# Patient Record
Sex: MALE | Race: WHITE | Employment: OTHER | URBAN - METROPOLITAN AREA
[De-identification: names, ages, dates, MRNs, and addresses within clinical notes are randomized per-mention and may not be internally consistent; named-entity substitution may affect disease eponyms.]

---

## 2017-03-28 ENCOUNTER — OFFICE VISIT (OUTPATIENT)
Dept: INTERNAL MEDICINE CLINIC | Age: 67
End: 2017-03-28

## 2017-03-28 ENCOUNTER — HOSPITAL ENCOUNTER (OUTPATIENT)
Dept: LAB | Age: 67
Discharge: HOME | End: 2017-03-28
Payer: MEDICARE

## 2017-03-28 VITALS
RESPIRATION RATE: 18 BRPM | HEART RATE: 58 BPM | DIASTOLIC BLOOD PRESSURE: 71 MMHG | WEIGHT: 212 LBS | TEMPERATURE: 98.3 F | HEIGHT: 66 IN | OXYGEN SATURATION: 97 % | SYSTOLIC BLOOD PRESSURE: 114 MMHG | BODY MASS INDEX: 34.07 KG/M2

## 2017-03-28 DIAGNOSIS — E11.42 TYPE 2 DIABETES MELLITUS WITH DIABETIC POLYNEUROPATHY, UNSPECIFIED LONG TERM INSULIN USE STATUS: Primary | ICD-10-CM

## 2017-03-28 DIAGNOSIS — E78.00 HYPERCHOLESTEREMIA: ICD-10-CM

## 2017-03-28 DIAGNOSIS — I10 ESSENTIAL HYPERTENSION: ICD-10-CM

## 2017-03-28 DIAGNOSIS — I25.10 CORONARY ARTERY DISEASE INVOLVING NATIVE CORONARY ARTERY OF NATIVE HEART WITHOUT ANGINA PECTORIS: ICD-10-CM

## 2017-03-28 PROCEDURE — 83036 HEMOGLOBIN GLYCOSYLATED A1C: CPT

## 2017-03-28 PROCEDURE — 80053 COMPREHEN METABOLIC PANEL: CPT

## 2017-03-28 PROCEDURE — 36415 COLL VENOUS BLD VENIPUNCTURE: CPT

## 2017-03-28 PROCEDURE — 80061 LIPID PANEL: CPT

## 2017-03-28 PROCEDURE — 85025 COMPLETE CBC W/AUTO DIFF WBC: CPT

## 2017-03-28 NOTE — MR AVS SNAPSHOT
Visit Information Date & Time Provider Department Dept. Phone Encounter #  
 3/28/2017  8:45 AM Sterling Roblero, [address] [id number] Follow-up Instructions Return in about 6 months (around 9/28/2017) for DM. Upcoming Health Maintenance Date Due COLONOSCOPY 5/19/1967 ZOSTER VACCINE AGE 60> 5/19/2009 MEDICARE YEARLY EXAM 5/29/2016 INFLUENZA AGE 9 TO ADULT 8/1/2016 EYE EXAM RETINAL OR DILATED Q1 10/19/2016 FOOT EXAM Q1 2/18/2017 HEMOGLOBIN A1C Q6M 2/19/2017 MICROALBUMIN Q1 8/19/2017 LIPID PANEL Q1 8/19/2017 GLAUCOMA SCREENING Q2Y 10/19/2017 Pneumococcal 65+ Low/Medium Risk (2 of 2 - PPSV23) 7/16/2018 DTaP/Tdap/Td series (2 - Td) 5/29/2025 Allergies as of 3/28/2017  Review Complete On: 3/28/2017 By: Sterling Roblero MD  
 No Known Allergies Current Immunizations  Reviewed on 3/28/2017 Name Date Influenza Vaccine 9/2/2015, 9/16/2014 Influenza Vaccine PF 10/30/2013 Pneumococcal Conjugate (PCV-13) 9/28/2015 Pneumococcal Polysaccharide (PPSV-23) 7/16/2013  6:31 PM  
 Tdap 5/29/2015 Reviewed by Sterling Roblero MD on 3/28/2017 at  9:23 AM  
 Reviewed by Sterling Roblero MD on 3/28/2017 at  9:25 AM  
You Were Diagnosed With   
  
 Codes Comments Type 2 diabetes mellitus with diabetic polyneuropathy, unspecified long term insulin use status ([facility]    -  Primary ICD-10-CM: E11.42 
ICD-9-CM: 250.60, 357.2 Essential hypertension     ICD-10-CM: I10 
ICD-9-CM: 401.9 Hypercholesteremia     ICD-10-CM: E78.00 ICD-9-CM: 272.0 Coronary artery disease involving native coronary artery of native heart without angina pectoris     ICD-10-CM: I25.10 ICD-9-CM: 414.01 Vitals BP Pulse Temp Resp Height(growth percentile) Weight(growth percentile) 114/71 (BP 1 Location: Left arm, BP Patient Position: Sitting) (!) 58 98.3 °F (36.8 °C) (Oral) 18 5' 6\" (1.676 m) 212 lb (96.2 kg) SpO2 BMI Smoking Status 97% 34.22 kg/m2 Never Smoker Vitals History BMI and BSA Data Body Mass Index Body Surface Area  
 34.22 kg/m 2 2.12 m 2 Preferred Pharmacy Pharmacy Name Phone [facility] #[address] [phone] Your Updated Medication List  
  
   
This list is accurate as of: 3/28/17  9:29 AM.  Always use your most recent med list.  
  
  
  
  
 aspirin 81 mg chewable tablet Take 1 Tab by mouth daily. clopidogrel 75 mg Tab Commonly known as:  PLAVIX Take 1 Tab by mouth daily. Diabetic Shoes Patient has diabetic foot neuropathy. He needs diabetic shoes to help with his diagnosis. Diabetic neuropathy: Code 250.60  
  
 gabapentin 600 mg tablet Commonly known as:  NEURONTIN Take 2 Tabs by mouth three (3) times daily. insulin detemir 100 unit/mL (3 mL) Inpn Commonly known as:  LEVEMIR FLEXTOUCH  
0.45 mL by SubCUTAneous route nightly. 45 Units by SubCUTAneous route nightly. * Insulin Needles (Disposable) 31 gauge x 3/16\" Ndle Commonly known as:  BD INSULIN PEN NEEDLE UF MINI Use as directed with Liset to inject insulin as directed * Insulin Needles (Disposable) 31 gauge x 1/4\" Ndle Commonly known as:  [place] Use as directed Lancets Misc Commonly known as:  Kimmy [place] Test TID  
  
 metFORMIN 500 mg tablet Commonly known as:  GLUCOPHAGE Take 1 Tab by mouth two (2) times daily (with meals). metoprolol tartrate 25 mg tablet Commonly known as:  LOPRESSOR Take 1 Tab by mouth two (2) times a day. pneumococcal 13 noman conj dip 0.5 mL Syrg injection Commonly known as:  PREVNAR 13 (PF)  
0.5 mL by IntraMUSCular route PRIOR TO DISCHARGE for 1 dose. pravastatin 40 mg tablet Commonly known as:  PRAVACHOL Take 40 mg by mouth nightly. Indications: HYPERCHOLESTEROLEMIA  
  
 simvastatin 10 mg tablet Commonly known as:  ZOCOR Take  by mouth nightly. traZODone 50 mg tablet Commonly known as:  Norm Oh Take 1 Tab by mouth nightly. valsartan-hydroCHLOROthiazide 160-25 mg per tablet Commonly known as:  DIOVAN-HCT  
TAKE ONE TABLET BY MOUTH ONE TIME DAILY * Notice: This list has 2 medication(s) that are the same as other medications prescribed for you. Read the directions carefully, and ask your doctor or other care provider to review them with you. We Performed the Following CBC WITH AUTOMATED DIFF [09292 CPT(R)] HEMOGLOBIN A1C WITH EAG [15231 CPT(R)] LIPID PANEL [83266 CPT(R)] METABOLIC PANEL, COMPREHENSIVE [39015 CPT(R)] Follow-up Instructions Return in about 6 months (around 9/28/2017) for DM. Introducing [facility] SERVICES! Dear Basilio Chung: 
Thank you for requesting a ePatientFinder account. Our records indicate that you already have an active ePatientFinder account. You can access your account anytime at [email or web address] [employer]/"[email or web address] Did you know that you can access your hospital and ER discharge instructions at any time in ePatientFinder? You can also review all of your test results from your hospital stay or ER visit. Additional Information If you have questions, please visit the Frequently Asked Questions section of the ePatientFinder website at [email or web address] [employer]/"[email or web address]/. Remember, ePatientFinder is NOT to be used for urgent needs. For medical emergencies, dial 911. Now available from your iPhone and Android! Please provide this summary of care documentation to your next provider. Your primary care clinician is listed as [place]. If you have any questions after today's visit, please call [phone].

## 2017-03-28 NOTE — PROGRESS NOTES
SUBJECTIVE:   Mr. Bushra Mckenzie. is a 78 y.o. male who is here for follow up of routine medical issues. Previously saw Dr. Oswald Gamboa. Chief Complaint   Patient presents with    Diabetes    Hypertension    Follow-up     pt here today for routine f.u       Sleeping better. Gets fatigue in the middle of the afternoon. Glc at home runs 130s - 140s. He sees Dr. Ervin Anders for CAD. Denies CP. He has severe neuralgias in feet, controlled by gabapentin. Dr. Ami Fair did EMG/NCV in Jan 2014. Last colonoscopy more than 10 years ago. At this time, he is otherwise doing well and has brought no other complaints to my attention today. For a list of the medical issues addressed today, see the assessment and plan below. PMH:   Past Medical History:   Diagnosis Date    CAD (coronary artery disease)     Diabetes ([address]     Heart disease     Hypertension        Past Surgical History:   Procedure Laterality Date    CARDIAC SURG PROCEDURE UNLIST      HX HEMORRHOIDECTOMY         All: He has No Known Allergies. Current Outpatient Prescriptions   Medication Sig    Insulin Needles, Disposable, (UNIFINE PENTIPS) 31 gauge x 1/4\" ndle Use as directed    valsartan-hydrochlorothiazide (DIOVAN-HCT) 160-25 mg per tablet TAKE ONE TABLET BY MOUTH ONE TIME DAILY    metFORMIN (GLUCOPHAGE) 500 mg tablet Take 1 Tab by mouth two (2) times daily (with meals).  gabapentin (NEURONTIN) 600 mg tablet Take 2 Tabs by mouth three (3) times daily.  traZODone (DESYREL) 50 mg tablet Take 1 Tab by mouth nightly.  insulin detemir (LEVEMIR FLEXTOUCH) 100 unit/mL (3 mL) inpn 0.45 mL by SubCUTAneous route nightly. 45 Units by SubCUTAneous route nightly.  simvastatin (ZOCOR) 10 mg tablet Take  by mouth nightly.  Insulin Needles, Disposable, (BD INSULIN PEN NEEDLE UF MINI) 31 gauge x 3/16\" ndle Use as directed with Liset to inject insulin as directed    Diabetic Shoes XX Patient has diabetic foot neuropathy.  He needs diabetic shoes to help with his diagnosis. Diabetic neuropathy: Code 250.60    Lancets (MICROLET LANCET) misc Test TID    aspirin 81 mg chewable tablet Take 1 Tab by mouth daily.  clopidogrel (PLAVIX) 75 mg tablet Take 1 Tab by mouth daily.  metoprolol (LOPRESSOR) 25 mg tablet Take 1 Tab by mouth two (2) times a day.  pneumococcal 13 noman conj dip (PREVNAR 13, PF,) 0.5 mL syrg injection 0.5 mL by IntraMUSCular route PRIOR TO DISCHARGE for 1 dose.  pravastatin (PRAVACHOL) 40 mg tablet Take 40 mg by mouth nightly. Indications: HYPERCHOLESTEROLEMIA     No current facility-administered medications for this visit. FH: His family history includes Heart Disease in his paternal uncle; and Hypertension in his paternal uncle. Mother  of cancer of some type. Father is living. His brother had prostate cancer. SH: . Retired from [employer], and from [employer], and from [employer]. He reports that he has never smoked. He has never used smokeless tobacco. He reports that he does not drink alcohol or use illicit drugs. ROS: See above; Complete ROS otherwise negative. OBJECTIVE:   Vitals:   Visit Vitals    /71 (BP 1 Location: Left arm, BP Patient Position: Sitting)    Pulse (!) 58    Temp 98.3 °F (36.8 °C) (Oral)    Resp 18    Ht 5' 6\" (1.676 m)    Wt 212 lb (96.2 kg)    SpO2 97%    BMI 34.22 kg/m2      Gen: Pleasant 78 y.o.  male in [facility]. HEENT: PERRLA. EOMI. OP moist and pink. Neck: Supple. No LAD. HEART: RRR, No M/G/R.    LUNGS: CTAB No W/R. ABDOMEN: S, NT, ND, BS+. EXTREMITIES: Warm. No C/C/E.  MUSCULOSKELETAL: Normal ROM, muscle strength 5/5 all groups. NEURO: Alert and oriented x 3. Cranial nerves grossly intact. No focal sensory or motor deficits noted. SKIN: Warm. Dry. No rashes or other lesions noted. Feet: Has onychomycosis of both great nails. Sensation diminished to monofilament in toes.      Lab Results   Component Value Date/Time    Sodium 139 08/19/2016 08:20 AM    Potassium 4.8 08/19/2016 08:20 AM    Chloride 100 08/19/2016 08:20 AM    CO2 24 08/19/2016 08:20 AM    Anion gap 12 07/17/2013 03:25 AM    Glucose 163 08/19/2016 08:20 AM    BUN 24 08/19/2016 08:20 AM    Creatinine 1.03 08/19/2016 08:20 AM    BUN/Creatinine ratio 23 08/19/2016 08:20 AM    GFR est AA 86 08/19/2016 08:20 AM    GFR est non-AA 75 08/19/2016 08:20 AM    Calcium 8.9 08/19/2016 08:20 AM    Bilirubin, total 0.4 08/19/2016 08:20 AM    ALT (SGPT) 16 08/19/2016 08:20 AM    AST (SGOT) 14 08/19/2016 08:20 AM    Alk. phosphatase 64 08/19/2016 08:20 AM    Protein, total 6.2 08/19/2016 08:20 AM    Albumin 4.2 08/19/2016 08:20 AM    Globulin 3.1 07/14/2013 09:50 AM    A-G Ratio 2.1 08/19/2016 08:20 AM       Lab Results   Component Value Date/Time    Cholesterol, total 144 08/19/2016 08:20 AM    HDL Cholesterol 36 08/19/2016 08:20 AM    LDL, calculated 87 08/19/2016 08:20 AM    Triglyceride 106 08/19/2016 08:20 AM    CHOL/HDL Ratio 5.1 07/15/2013 09:10 AM        Lab Results   Component Value Date/Time    Hemoglobin A1c 9.3 08/19/2016 08:20 AM       Lab Results   Component Value Date/Time    WBC 6.7 08/19/2016 08:20 AM    HGB 15.4 08/19/2016 08:20 AM    HCT 46.5 08/19/2016 08:20 AM    PLATELET 773 61/41/1699 08:20 AM    MCV 94 08/19/2016 08:20 AM         ASSESSMENT/ PLAN: Susu Tracy was seen today for follow up. DM (diabetes mellitus): Not controlled at last check. What he is getting at home isn't bad. Continue insulin. Seeing Dr. Najera [place] with Endocrinology.   - HEMOGLOBIN N7Z  - METABOLIC PANEL, COMPREHENSIVE  - LIPID PANEL    Midday fatigue: Check glucose. Insomnia: Improved. trazodone. Anxiety: Bedtime trazodone. Consider other agents down the road. HTN (hypertension): Borderline BP; watch this. - METABOLIC PANEL, COMPREHENSIVE  - LIPID PANEL  - CBC WITH AUTOMATED DIFF    Hypercholesteremia  - LIPID PANEL    Coronary artery disease: No CP today. F/U with Cardiology. Diabetic neuropathy: Ongoing, stable. Gapabentin helps. - gabapentin (NEURONTIN) 600 mg tablet; Take 2 tablets by mouth three (3) times daily. Colon cancer screening: Previously have referred to GI. He is disinclined to follow through--\"I heard too many bad stories\"; I reassured him that the procedure saves far more lives than it harms. Follow-up Disposition:  Return in about 6 months (around 9/28/2017) for DM. I have reviewed the patient's medications and risks/side effects/benefits were discussed. Diagnosis(-es) explained to patient and questions answered. Literature provided where appropriate.

## 2017-03-28 NOTE — PROGRESS NOTES
1. Have you been to the ER, urgent care clinic since your last visit? Hospitalized since your last visit?no    2. Have you seen or consulted any other health care providers outside of the [address] [place] [address] since your last visit? Include any pap smears or colon screening.  no

## 2017-03-29 LAB
ALBUMIN SERPL-MCNC: 4.5 G/DL (ref 3.6–4.8)
ALBUMIN/GLOB SERPL: 2.3 {RATIO} (ref 1.2–2.2)
ALP SERPL-CCNC: 67 IU/L (ref 39–117)
ALT SERPL-CCNC: 16 IU/L (ref 0–44)
AST SERPL-CCNC: 15 IU/L (ref 0–40)
BASOPHILS # BLD AUTO: 0 X10E3/UL (ref 0–0.2)
BASOPHILS NFR BLD AUTO: 0 %
BILIRUB SERPL-MCNC: 0.5 MG/DL (ref 0–1.2)
BUN SERPL-MCNC: 17 MG/DL (ref 8–27)
BUN/CREAT SERPL: 18 (ref 10–22)
CALCIUM SERPL-MCNC: 9.3 MG/DL (ref 8.6–10.2)
CHLORIDE SERPL-SCNC: 99 MMOL/L (ref 96–106)
CHOLEST SERPL-MCNC: 170 MG/DL (ref 100–199)
CO2 SERPL-SCNC: 21 MMOL/L (ref 18–29)
CREAT SERPL-MCNC: 0.96 MG/DL (ref 0.76–1.27)
EOSINOPHIL # BLD AUTO: 0.1 X10E3/UL (ref 0–0.4)
EOSINOPHIL NFR BLD AUTO: 1 %
ERYTHROCYTE [DISTWIDTH] IN BLOOD BY AUTOMATED COUNT: 13.4 % (ref 12.3–15.4)
EST. AVERAGE GLUCOSE BLD GHB EST-MCNC: 223 MG/DL
GLOBULIN SER CALC-MCNC: 2 G/DL (ref 1.5–4.5)
GLUCOSE SERPL-MCNC: 247 MG/DL (ref 65–99)
HBA1C MFR BLD: 9.4 % (ref 4.8–5.6)
HCT VFR BLD AUTO: 46.8 % (ref 37.5–51)
HDLC SERPL-MCNC: 40 MG/DL
HGB BLD-MCNC: 15.8 G/DL (ref 12.6–17.7)
IMM GRANULOCYTES # BLD: 0 X10E3/UL (ref 0–0.1)
IMM GRANULOCYTES NFR BLD: 0 %
LDLC SERPL CALC-MCNC: 104 MG/DL (ref 0–99)
LYMPHOCYTES # BLD AUTO: 2.2 X10E3/UL (ref 0.7–3.1)
LYMPHOCYTES NFR BLD AUTO: 32 %
MCH RBC QN AUTO: 31.2 PG (ref 26.6–33)
MCHC RBC AUTO-ENTMCNC: 33.8 G/DL (ref 31.5–35.7)
MCV RBC AUTO: 93 FL (ref 79–97)
MONOCYTES # BLD AUTO: 0.7 X10E3/UL (ref 0.1–0.9)
MONOCYTES NFR BLD AUTO: 10 %
NEUTROPHILS # BLD AUTO: 4 X10E3/UL (ref 1.4–7)
NEUTROPHILS NFR BLD AUTO: 57 %
PLATELET # BLD AUTO: 221 X10E3/UL (ref 150–379)
POTASSIUM SERPL-SCNC: 4.9 MMOL/L (ref 3.5–5.2)
PROT SERPL-MCNC: 6.5 G/DL (ref 6–8.5)
RBC # BLD AUTO: 5.06 X10E6/UL (ref 4.14–5.8)
SODIUM SERPL-SCNC: 141 MMOL/L (ref 134–144)
TRIGL SERPL-MCNC: 132 MG/DL (ref 0–149)
VLDLC SERPL CALC-MCNC: 26 MG/DL (ref 5–40)
WBC # BLD AUTO: 7 X10E3/UL (ref 3.4–10.8)

## 2017-04-05 ENCOUNTER — TELEPHONE (OUTPATIENT)
Dept: INTERNAL MEDICINE CLINIC | Age: 67
End: 2017-04-05

## 2017-07-05 RX ORDER — METFORMIN HYDROCHLORIDE 500 MG/1
TABLET ORAL
Qty: 180 TAB | Refills: 2 | Status: SHIPPED | OUTPATIENT
Start: 2017-07-05 | End: 2018-05-21 | Stop reason: SDUPTHER

## 2017-07-26 NOTE — TELEPHONE ENCOUNTER
Patient's wife, Makenna Wei states patient needs prescription to show uses 2 needles a day & prescription to be for 2 a day at 90 day supply with refills thru [facility]. Please call if any questions.  Thank you

## 2017-07-27 RX ORDER — PEN NEEDLE, DIABETIC 31 GX3/16"
NEEDLE, DISPOSABLE MISCELLANEOUS
Qty: 100 PEN NEEDLE | Refills: 6 | Status: SHIPPED | OUTPATIENT
Start: 2017-07-27 | End: 2018-01-03 | Stop reason: SDUPTHER

## 2017-07-27 NOTE — TELEPHONE ENCOUNTER
Patient's wife, Kevin Lieberman states patient needs prescription to show uses 2 needles a day & prescription to be for 2 a day at 90 day supply with refills thru [facility]. Please call if any questions.  Thank you                  Documentation                                 Forwarded to dr. Sunita Kiran for review

## 2017-08-01 ENCOUNTER — OFFICE VISIT (OUTPATIENT)
Dept: INTERNAL MEDICINE CLINIC | Age: 67
End: 2017-08-01

## 2017-08-01 VITALS
TEMPERATURE: 98.6 F | HEART RATE: 57 BPM | HEIGHT: 66 IN | DIASTOLIC BLOOD PRESSURE: 71 MMHG | SYSTOLIC BLOOD PRESSURE: 113 MMHG | BODY MASS INDEX: 33.85 KG/M2 | OXYGEN SATURATION: 96 % | RESPIRATION RATE: 14 BRPM | WEIGHT: 210.6 LBS

## 2017-08-01 DIAGNOSIS — I10 ESSENTIAL HYPERTENSION: ICD-10-CM

## 2017-08-01 DIAGNOSIS — Z12.11 COLON CANCER SCREENING: ICD-10-CM

## 2017-08-01 DIAGNOSIS — E78.00 HYPERCHOLESTEREMIA: ICD-10-CM

## 2017-08-01 DIAGNOSIS — Z13.39 SCREENING FOR ALCOHOLISM: ICD-10-CM

## 2017-08-01 DIAGNOSIS — E11.42 TYPE 2 DIABETES MELLITUS WITH DIABETIC POLYNEUROPATHY, UNSPECIFIED LONG TERM INSULIN USE STATUS: ICD-10-CM

## 2017-08-01 DIAGNOSIS — Z00.00 ROUTINE GENERAL MEDICAL EXAMINATION AT A HEALTH CARE FACILITY: Primary | ICD-10-CM

## 2017-08-01 DIAGNOSIS — I25.10 CORONARY ARTERY DISEASE INVOLVING NATIVE CORONARY ARTERY OF NATIVE HEART WITHOUT ANGINA PECTORIS: ICD-10-CM

## 2017-08-01 NOTE — PROGRESS NOTES
Medicare  This is a Subsequent Medicare Annual Wellness Visit providing Personalized Prevention Plan Services (PPPS) (Performed 12 months after initial AWV and PPPS )    I have reviewed the patient's medical history in detail and updated the computerized patient record. History     Past Medical History:   Diagnosis Date    CAD (coronary artery disease)     Diabetes ([address]     Heart disease     Hypertension       Past Surgical History:   Procedure Laterality Date    CARDIAC SURG PROCEDURE UNLIST      HX HEMORRHOIDECTOMY       Current Outpatient Prescriptions   Medication Sig Dispense Refill    Insulin Needles, Disposable, (BD INSULIN PEN NEEDLE UF MINI) 31 gauge x 3/16\" ndle Use as directed with Levimer to inject insulin as directed 100 Pen Needle 6    metFORMIN (GLUCOPHAGE) 500 mg tablet TAKE 1 TABLET TWICE A DAY WITH MEALS 180 Tab 2    Insulin Needles, Disposable, (UNIFINE PENTIPS) 31 gauge x 1/4\" ndle Use as directed 100 Pen Needle 3    valsartan-hydrochlorothiazide (DIOVAN-HCT) 160-25 mg per tablet TAKE ONE TABLET BY MOUTH ONE TIME DAILY 90 Tab 3    gabapentin (NEURONTIN) 600 mg tablet Take 2 Tabs by mouth three (3) times daily. 540 Tab 3    traZODone (DESYREL) 50 mg tablet Take 1 Tab by mouth nightly. 90 Tab 3    insulin detemir (LEVEMIR FLEXTOUCH) 100 unit/mL (3 mL) inpn 0.45 mL by SubCUTAneous route nightly. 45 Units by SubCUTAneous route nightly. 1500 mL 3    pneumococcal 13 noman conj dip (PREVNAR 13, PF,) 0.5 mL syrg injection 0.5 mL by IntraMUSCular route PRIOR TO DISCHARGE for 1 dose. 1 Syringe 0    simvastatin (ZOCOR) 10 mg tablet Take  by mouth nightly.  Diabetic Shoes XX Patient has diabetic foot neuropathy. He needs diabetic shoes to help with his diagnosis. Diabetic neuropathy: Code 250.60 1 Device 0    Lancets (MICROLET LANCET) misc Test  Each 11    aspirin 81 mg chewable tablet Take 1 Tab by mouth daily.  30 Tab 11    clopidogrel (PLAVIX) 75 mg tablet Take 1 Tab by mouth daily. 30 Tab 11    metoprolol (LOPRESSOR) 25 mg tablet Take 1 Tab by mouth two (2) times a day. 60 Tab 11    pravastatin (PRAVACHOL) 40 mg tablet Take 40 mg by mouth nightly. Indications: HYPERCHOLESTEROLEMIA       No Known Allergies  Family History   Problem Relation Age of Onset    Cancer Mother      ovarian    Cancer Brother      Prostate    Hypertension Paternal Uncle     Heart Disease Paternal Uncle     Alcohol abuse Father     No Known Problems Brother     No Known Problems Brother     No Known Problems Brother      Social History   Substance Use Topics    Smoking status: Never Smoker    Smokeless tobacco: Never Used      Comment: occ    Alcohol use No      Comment: ETOH free for 2 years     Patient Active Problem List   Diagnosis Code    DM (diabetes mellitus) ([facility] E11.9    HTN (hypertension) I10    Hypercholesteremia E78.00    Diabetic neuropathy [facility] E11.40    Coronary artery disease I25.10    H/O heart artery stent Z95.5    Essential hypertension I10       Depression Risk Factor Screening:     PHQ over the last two weeks 3/28/2017   Little interest or pleasure in doing things Not at all   Feeling down, depressed or hopeless Not at all   Total Score PHQ 2 0     Alcohol Risk Factor Screening: On any occasion during the past 3 months, have you had more than 4 drinks containing alcohol? No    Do you average more than 14 drinks per week? No        Functional Ability and Level of Safety:     Hearing Loss   none    Activities of Daily Living   Self-care. Requires assistance with: no ADLs    Fall Risk   Fall Risk Assessment, last 12 mths 3/28/2017   Able to walk? Yes   Fall in past 12 months? No     Abuse Screen   Patient is not abused    Review of Systems   A comprehensive review of systems was negative except for that written in the HPI.     Physical Examination     Evaluation of Cognitive Function:  Mood/affect:  okay  Appearance: age appropriate    See other note    Patient Care Team:  Clare Gonzalez MD as PCP - General (Internal Medicine)  Jocelyn Merritt MD (Cardiology)  Sourav Gomez MD (Neurology)    Advice/Referrals/Counseling   Education and counseling provided:  Are appropriate based on today's review and evaluation      Assessment/Plan   current treatment plan is effective, no change in therapy.

## 2017-08-01 NOTE — MR AVS SNAPSHOT
Visit Information Date & Time Provider Department Dept. Phone Encounter #  
 8/1/2017 10:30 AM Negar Alcantara, [address] [phone] [id number] Follow-up Instructions Return in about 4 months (around 12/1/2017) for DM. Follow-up and Disposition History Upcoming Health Maintenance Date Due COLONOSCOPY 5/19/1967 ZOSTER VACCINE AGE 60> 3/19/2009 MEDICARE YEARLY EXAM 5/29/2016 EYE EXAM RETINAL OR DILATED Q1 10/19/2016 INFLUENZA AGE 9 TO ADULT 8/1/2017 MICROALBUMIN Q1 8/19/2017 HEMOGLOBIN A1C Q6M 9/28/2017 GLAUCOMA SCREENING Q2Y 10/19/2017 FOOT EXAM Q1 3/28/2018 LIPID PANEL Q1 3/28/2018 Pneumococcal 65+ Low/Medium Risk (2 of 2 - PPSV23) 7/16/2018 DTaP/Tdap/Td series (2 - Td) 5/29/2025 Allergies as of 8/1/2017  Review Complete On: 8/1/2017 By: Negar Alcantara MD  
 No Known Allergies Current Immunizations  Reviewed on 3/28/2017 Name Date Influenza Vaccine 9/2/2015, 9/16/2014 Influenza Vaccine PF 10/30/2013 Pneumococcal Conjugate (PCV-13) 9/28/2015 Pneumococcal Polysaccharide (PPSV-23) 7/16/2013  6:31 PM  
 Tdap 5/29/2015 Not reviewed this visit You Were Diagnosed With   
  
 Codes Comments Routine general medical examination at a health care facility    -  Primary ICD-10-CM: Z00.00 ICD-9-CM: V70.0 Type 2 diabetes mellitus with diabetic polyneuropathy, unspecified long term insulin use status (HCC)     ICD-10-CM: E11.42 
ICD-9-CM: 250.60, 357.2 Essential hypertension     ICD-10-CM: I10 
ICD-9-CM: 401.9 Hypercholesteremia     ICD-10-CM: E78.00 ICD-9-CM: 272.0 Coronary artery disease involving native coronary artery of native heart without angina pectoris     ICD-10-CM: I25.10 ICD-9-CM: 414.01 Colon cancer screening     ICD-10-CM: Z12.11 ICD-9-CM: V76.51 Screening for alcoholism     ICD-10-CM: Z13.89 ICD-9-CM: V79.1 Vitals BP Pulse Temp Resp Height(growth percentile) Weight(growth percentile) 113/71 (BP 1 Location: Left arm, BP Patient Position: Sitting) (!) 57 98.6 °F (37 °C) (Oral) 14 5' 6\" (1.676 m) 210 lb 9.6 oz (95.5 kg) SpO2 BMI Smoking Status 96% 33.99 kg/m2 Never Smoker Vitals History BMI and BSA Data Body Mass Index Body Surface Area  
 33.99 kg/m 2 2.11 m 2 Preferred Pharmacy Pharmacy Name Phone [address] [facility] [phone] Your Updated Medication List  
  
   
This list is accurate as of: 8/1/17 11:29 AM.  Always use your most recent med list.  
  
  
  
  
 aspirin 81 mg chewable tablet Take 1 Tab by mouth daily. clopidogrel 75 mg Tab Commonly known as:  PLAVIX Take 1 Tab by mouth daily. Diabetic Shoes Patient has diabetic foot neuropathy. He needs diabetic shoes to help with his diagnosis. Diabetic neuropathy: Code 250.60  
  
 gabapentin 600 mg tablet Commonly known as:  NEURONTIN Take 2 Tabs by mouth three (3) times daily. insulin detemir 100 unit/mL (3 mL) Inpn Commonly known as:  LEVEMIR FLEXTOUCH  
0.45 mL by SubCUTAneous route nightly. 45 Units by SubCUTAneous route nightly. * Insulin Needles (Disposable) 31 gauge x 1/4\" Ndle Commonly known as:  Madeline Low Use as directed * Insulin Needles (Disposable) 31 gauge x 3/16\" Ndle Commonly known as:  BD INSULIN PEN NEEDLE UF MINI Use as directed with Liset to inject insulin as directed Lancets Misc Commonly known as:  Marlene Evangelical Test TID  
  
 metFORMIN 500 mg tablet Commonly known as:  GLUCOPHAGE  
TAKE 1 TABLET TWICE A DAY WITH MEALS  
  
 metoprolol tartrate 25 mg tablet Commonly known as:  LOPRESSOR Take 1 Tab by mouth two (2) times a day. pneumococcal 13 noman conj dip 0.5 mL Syrg injection Commonly known as:  PREVNAR 13 (PF)  
 0.5 mL by IntraMUSCular route PRIOR TO DISCHARGE for 1 dose. pravastatin 40 mg tablet Commonly known as:  PRAVACHOL Take 40 mg by mouth nightly. Indications: HYPERCHOLESTEROLEMIA  
  
 simvastatin 10 mg tablet Commonly known as:  ZOCOR Take  by mouth nightly. traZODone 50 mg tablet Commonly known as:  Chica Demi Take 1 Tab by mouth nightly. valsartan-hydroCHLOROthiazide 160-25 mg per tablet Commonly known as:  DIOVAN-HCT  
TAKE ONE TABLET BY MOUTH ONE TIME DAILY * Notice: This list has 2 medication(s) that are the same as other medications prescribed for you. Read the directions carefully, and ask your doctor or other care provider to review them with you. We Performed the Following CBC WITH AUTOMATED DIFF [46540 CPT(R)] HEMOGLOBIN A1C WITH EAG [57290 CPT(R)] LIPID PANEL [26036 CPT(R)] METABOLIC PANEL, COMPREHENSIVE [89082 CPT(R)] MICROALBUMIN, UR, RAND [id number] CPT(R)] OCCULT BLOOD, IMMUNOASSAY (FIT) C502979 CPT(R)] REFERRAL TO GASTROENTEROLOGY [CAP39 Custom] REFERRAL TO OPHTHALMOLOGY [REF57 Custom] Follow-up Instructions Return in about 4 months (around 12/1/2017) for DM. Referral Information Referral ID Referred By Referred To  
  
 [id number] HOLLY, [address] [facility] [address] [place] [address], [address] [facility] [address] Phone: [phone] Fax: [phone] Visits Status Start Date End Date 1 New Request 8/1/17 8/1/18 If your referral has a status of pending review or denied, additional information will be sent to support the outcome of this decision. Referral ID Referred By Referred To  
 [id number] HOLLY [address] [place] [address]  
   305 [facility] [address] Visits Status Start Date End Date 1 New Request 8/1/17 8/1/18  If your referral has a status of pending review or denied, additional information will be sent to support the outcome of this decision. Patient Instructions Medicare Part B Preventive Services Limitations Recommendation Scheduled Bone Mass Measurement 
(age 72 & older, biennial) Requires diagnosis related to osteoporosis or estrogen deficiency. Biennial benefit unless patient has history of long-term glucocorticoid tx or baseline is needed because initial test was by other method Cardiovascular Screening Blood Tests (every 5 years) Total cholesterol, HDL, Triglycerides Order as a panel if possible Colorectal Cancer Screening 
-Fecal occult blood test (annual) -Flexible sigmoidoscopy (5y) 
-Screening colonoscopy (10y) -Barium Enema Counseling to Prevent Tobacco Use (up to 8 sessions per year) - Counseling greater than 3 and up to 10 minutes - Counseling greater than 10 minutes Patients must be asymptomatic of tobacco-related conditions to receive as preventive service Diabetes Screening Tests (at least every 3 years, Medicare covers annually or at 6-month intervals for prediabetic patients) Fasting blood sugar (FBS) or glucose tolerance test (GTT) Patient must be diagnosed with one of the following: 
-Hypertension, Dyslipidemia, obesity, previous impaired FBS or GTT 
Or any two of the following: overweight, FH of diabetes, age ? 72, history of gestational diabetes, birth of baby weighing more than 9 pounds Diabetes Self-Management Training (DSMT) (no USPSTF recommendation) Requires referral by treating physician for patient with diabetes or renal disease. 10 hours of initial DSMT session of no less than 30 minutes each in a continuous 12-month period. 2 hours of follow-up DSMT in subsequent years. Glaucoma Screening (no USPSTF recommendation) Diabetes mellitus, family history, , age 48 or over,  American, age 72 or over Human Immunodeficiency Virus (HIV) Screening (annually for increased risk patients) HIV-1 and HIV-2 by EIA, MARCOS, rapid antibody test, or oral mucosa transudate Patient must be at increased risk for HIV infection per USPSTF guidelines or pregnant. Tests covered annually for patients at increased risk. Pregnant patients may receive up to 3 test during pregnancy. Medical Nutrition Therapy (MNT) (for diabetes or renal disease not recommended schedule) Requires referral by treating physician for patient with diabetes or renal disease. Can be provided in same year as diabetes self-management training (DSMT), and CMS recommends medical nutrition therapy take place after DSMT. Up to 3 hours for initial year and 2 hours in subsequent years. Prostate Cancer Screening (annually up to age 76) - Digital rectal exam (NGA) - Prostate specific antigen (PSA) Annually (age 48 or over), NGA not paid separately when covered E/M service is provided on same date Men up to age 76 may need a screening blood test for prostate cancer at certain intervals, depending on their personal and family history. This decision is between the patient and his provider. Seasonal Influenza Vaccination (annually) Pneumococcal Vaccination (once after 65) Hepatitis B Vaccinations (if medium/high risk) Medium/high risk factors:  End-stage renal disease, Hemophiliacs who received Factor VIII or IX concentrates, Clients of institutions for the mentally retarded, Persons who live in the same house as a HepB virus carrier, Homosexual men, Illicit injectable drug abusers. Shingles Vaccination A shingles vaccine is also recommended once in a lifetime after age 61 Ultrasound Screening for Abdominal Aortic Aneurysm (AAA) (once) Patient must be referred through [facility] and not have had a screening for abdominal aortic aneurysm before under Medicare. Limited to patients who meet one of the following criteria: 
- Men who are 73-68 years old and have smoked more than 100 cigarettes in their lifetime. -Anyone with a FH of AAA 
-Anyone recommended for screening by USPSTF Introducing [facility]! Dear Lesile Alvarado: 
Thank you for requesting a Screenleap account. Our records indicate that you already have an active Screenleap account. You can access your account anytime at [email or web address] Did you know that you can access your hospital and ER discharge instructions at any time in Screenleap? You can also review all of your test results from your hospital stay or ER visit. Additional Information If you have questions, please visit the Frequently Asked Questions section of the Screenleap website at [email or web address] Remember, Screenleap is NOT to be used for urgent needs. For medical emergencies, dial 911. Now available from your iPhone and Android! Please provide this summary of care documentation to your next provider. Your primary care clinician is listed as [place]. If you have any questions after today's visit, please call [phone].

## 2017-08-01 NOTE — PROGRESS NOTES
SUBJECTIVE:   Mr. Cristiano Osei. is a 75 y.o. male who is here for follow up of routine medical issues. Previously saw Dr. Pershing Scheuermann. Chief Complaint   Patient presents with    Hypertension    Follow-up     pt here today for 4 monthy f.u    Annual Wellness Visit     pt due for medicare welleness    Diabetes     pt due for eye; pt refused fundus and wants to see eye doctor     Colon Cancer Screening     pt asked about colonoscopy - pt states that he dont want to do it b.c of the prep; pt wants to discuss other options       Sleeping better. Gets 5 1/2 to 6 hours. Still gets fatigue in the middle of the afternoon. Glc at home runs 130s - 140s. He sees Dr. Brayden Cole for CAD. Denies CP. He has severe neuralgias in feet, controlled by gabapentin. Dr. Daren Renteria did EMG/NCV in Jan 2014. Last colonoscopy more than 10 years ago. At this time, he is otherwise doing well and has brought no other complaints to my attention today. For a list of the medical issues addressed today, see the assessment and plan below. PMH:   Past Medical History:   Diagnosis Date    CAD (coronary artery disease)     Diabetes ([facility] [address]     Heart disease     Hypertension        Past Surgical History:   Procedure Laterality Date    CARDIAC SURG PROCEDURE UNLIST      HX HEMORRHOIDECTOMY         All: He has No Known Allergies. Current Outpatient Prescriptions   Medication Sig    Insulin Needles, Disposable, (BD INSULIN PEN NEEDLE UF MINI) 31 gauge x 3/16\" ndle Use as directed with Liset to inject insulin as directed    metFORMIN (GLUCOPHAGE) 500 mg tablet TAKE 1 TABLET TWICE A DAY WITH MEALS    Insulin Needles, Disposable, (UNIFINE PENTIPS) 31 gauge x 1/4\" ndle Use as directed    valsartan-hydrochlorothiazide (DIOVAN-HCT) 160-25 mg per tablet TAKE ONE TABLET BY MOUTH ONE TIME DAILY    gabapentin (NEURONTIN) 600 mg tablet Take 2 Tabs by mouth three (3) times daily.     traZODone (DESYREL) 50 mg tablet Take 1 Tab by mouth nightly.  insulin detemir (LEVEMIR FLEXTOUCH) 100 unit/mL (3 mL) inpn 0.45 mL by SubCUTAneous route nightly. 45 Units by SubCUTAneous route nightly.  pneumococcal 13 noman conj dip (PREVNAR 13, PF,) 0.5 mL syrg injection 0.5 mL by IntraMUSCular route PRIOR TO DISCHARGE for 1 dose.  simvastatin (ZOCOR) 10 mg tablet Take  by mouth nightly.  Diabetic Shoes XX Patient has diabetic foot neuropathy. He needs diabetic shoes to help with his diagnosis. Diabetic neuropathy: Code 250.60    Lancets (MICROLET LANCET) misc Test TID    aspirin 81 mg chewable tablet Take 1 Tab by mouth daily.  clopidogrel (PLAVIX) 75 mg tablet Take 1 Tab by mouth daily.  metoprolol (LOPRESSOR) 25 mg tablet Take 1 Tab by mouth two (2) times a day.  pravastatin (PRAVACHOL) 40 mg tablet Take 40 mg by mouth nightly. Indications: HYPERCHOLESTEROLEMIA     No current facility-administered medications for this visit. FH: His family history includes Heart Disease in his paternal uncle; and Hypertension in his paternal uncle. Mother  of cancer of some type. Father is living. His brother had prostate cancer. SH: . Retired from [employer], and from [employer], and from [employer]. He reports that he has never smoked. He has never used smokeless tobacco. He reports that he does not drink alcohol or use illicit drugs. ROS: See above; Complete ROS otherwise negative. OBJECTIVE:   Vitals:   Visit Vitals    /71 (BP 1 Location: Left arm, BP Patient Position: Sitting)    Pulse (!) 57    Temp 98.6 °F (37 °C) (Oral)    Resp 14    Ht 5' 6\" (1.676 m)    Wt 210 lb 9.6 oz (95.5 kg)    SpO2 96%    BMI 33.99 kg/m2      Gen: Pleasant 75 y.o.  male in NAD. HEENT: PERRLA. EOMI. OP moist and pink. Neck: Supple. No LAD. HEART: RRR, No M/G/R.    LUNGS: CTAB No W/R. ABDOMEN: S, NT, ND, BS+. EXTREMITIES: Warm. No C/C/E.  MUSCULOSKELETAL: Normal ROM, muscle strength 5/5 all groups.   NEURO: Alert and oriented x 3.  Cranial nerves grossly intact. No focal sensory or motor deficits noted. SKIN: Warm. Dry. No rashes or other lesions noted. Feet: Has onychomycosis of both great nails. Sensation diminished to monofilament in toes. Lab Results   Component Value Date/Time    Sodium 141 03/28/2017 09:47 AM    Potassium 4.9 03/28/2017 09:47 AM    Chloride 99 03/28/2017 09:47 AM    CO2 21 03/28/2017 09:47 AM    Anion gap 12 07/17/2013 03:25 AM    Glucose 247 03/28/2017 09:47 AM    BUN 17 03/28/2017 09:47 AM    Creatinine 0.96 03/28/2017 09:47 AM    BUN/Creatinine ratio 18 03/28/2017 09:47 AM    GFR est AA 94 03/28/2017 09:47 AM    GFR est non-AA 81 03/28/2017 09:47 AM    Calcium 9.3 03/28/2017 09:47 AM    Bilirubin, total 0.5 03/28/2017 09:47 AM    ALT (SGPT) 16 03/28/2017 09:47 AM    AST (SGOT) 15 03/28/2017 09:47 AM    Alk. phosphatase 67 03/28/2017 09:47 AM    Protein, total 6.5 03/28/2017 09:47 AM    Albumin 4.5 03/28/2017 09:47 AM    Globulin 3.1 07/14/2013 09:50 AM    A-G Ratio 2.3 03/28/2017 09:47 AM       Lab Results   Component Value Date/Time    Cholesterol, total 170 03/28/2017 09:47 AM    HDL Cholesterol 40 03/28/2017 09:47 AM    LDL, calculated 104 03/28/2017 09:47 AM    Triglyceride 132 03/28/2017 09:47 AM    CHOL/HDL Ratio 5.1 07/15/2013 09:10 AM        Lab Results   Component Value Date/Time    Hemoglobin A1c 9.4 03/28/2017 09:47 AM       Lab Results   Component Value Date/Time    WBC 7.0 03/28/2017 09:47 AM    HGB 15.8 03/28/2017 09:47 AM    HCT 46.8 03/28/2017 09:47 AM    PLATELET 854 69/77/3625 09:47 AM    MCV 93 03/28/2017 09:47 AM         ASSESSMENT/ PLAN: Lili Suarez was seen today for follow up. DM (diabetes mellitus): Not controlled at last check. What he is getting at home isn't bad. Continue insulin. Seeing Dr. Anna Corbin with Endocrinology.   - HEMOGLOBIN W7P  - METABOLIC PANEL, COMPREHENSIVE  - LIPID PANEL    Midday fatigue: Check glucose. Insomnia: Improved. trazodone.      Anxiety: Bedtime trazodone. Consider other agents down the road. HTN (hypertension): Borderline BP; watch this. - METABOLIC PANEL, COMPREHENSIVE  - LIPID PANEL  - CBC WITH AUTOMATED DIFF    Hypercholesteremia  - LIPID PANEL    Coronary artery disease: No CP today. F/U with Cardiology. Diabetic neuropathy: Ongoing, stable. Gapabentin helps. - gabapentin (NEURONTIN) 600 mg tablet; Take 2 tablets by mouth three (3) times daily. Colon cancer screening: Previously have referred to GI. He is disinclined to follow through--\"I heard too many bad stories\"; I reassured him that the procedure saves far more lives than it harms. Follow-up Disposition:  Return in about 4 months (around 12/1/2017) for DM. I have reviewed the patient's medications and risks/side effects/benefits were discussed. Diagnosis(-es) explained to patient and questions answered. Literature provided where appropriate.

## 2017-08-01 NOTE — PATIENT INSTRUCTIONS
Medicare Part B Preventive Services Limitations Recommendation Scheduled   Bone Mass Measurement  (age 72 & older, biennial) Requires diagnosis related to osteoporosis or estrogen deficiency. Biennial benefit unless patient has history of long-term glucocorticoid tx or baseline is needed because initial test was by other method     Cardiovascular Screening Blood Tests (every 5 years)  Total cholesterol, HDL, Triglycerides Order as a panel if possible     Colorectal Cancer Screening  -Fecal occult blood test (annual)  -Flexible sigmoidoscopy (5y)  -Screening colonoscopy (10y)  -Barium Enema      Counseling to Prevent Tobacco Use (up to 8 sessions per year)  - Counseling greater than 3 and up to 10 minutes  - Counseling greater than 10 minutes Patients must be asymptomatic of tobacco-related conditions to receive as preventive service     Diabetes Screening Tests (at least every 3 years, Medicare covers annually or at 6-month intervals for prediabetic patients)    Fasting blood sugar (FBS) or glucose tolerance test (GTT) Patient must be diagnosed with one of the following:  -Hypertension, Dyslipidemia, obesity, previous impaired FBS or GTT  Or any two of the following: overweight, FH of diabetes, age ? 72, history of gestational diabetes, birth of baby weighing more than 9 pounds     Diabetes Self-Management Training (DSMT) (no USPSTF recommendation) Requires referral by treating physician for patient with diabetes or renal disease. 10 hours of initial DSMT session of no less than 30 minutes each in a continuous 12-month period. 2 hours of follow-up DSMT in subsequent years.      Glaucoma Screening (no USPSTF recommendation) Diabetes mellitus, family history, , age 48 or over,  American, age 72 or over     Human Immunodeficiency Virus (HIV) Screening (annually for increased risk patients)  HIV-1 and HIV-2 by EIA, MARCOS, rapid antibody test, or oral mucosa transudate Patient must be at increased risk for HIV infection per USPSTF guidelines or pregnant. Tests covered annually for patients at increased risk. Pregnant patients may receive up to 3 test during pregnancy. Medical Nutrition Therapy (MNT) (for diabetes or renal disease not recommended schedule) Requires referral by treating physician for patient with diabetes or renal disease. Can be provided in same year as diabetes self-management training (DSMT), and CMS recommends medical nutrition therapy take place after DSMT. Up to 3 hours for initial year and 2 hours in subsequent years. Prostate Cancer Screening (annually up to age 76)  - Digital rectal exam (NGA)  - Prostate specific antigen (PSA) Annually (age 48 or over), NGA not paid separately when covered E/M service is provided on same date  Men up to age 76 may need a screening blood test for prostate cancer at certain intervals, depending on their personal and family history. This decision is between the patient and his provider. Seasonal Influenza Vaccination (annually)        Pneumococcal Vaccination (once after 72)      Hepatitis B Vaccinations (if medium/high risk) Medium/high risk factors:  End-stage renal disease,  Hemophiliacs who received Factor VIII or IX concentrates, Clients of institutions for the mentally retarded, Persons who live in the same house as a HepB virus carrier, Homosexual men, Illicit injectable drug abusers. Shingles Vaccination A shingles vaccine is also recommended once in a lifetime after age 61     Ultrasound Screening for Abdominal Aortic Aneurysm (AAA) (once) Patient must be referred through [facility] and not have had a screening for abdominal aortic aneurysm before under Medicare.   Limited to patients who meet one of the following criteria:  - Men who are 73-68 years old and have smoked more than 100 cigarettes in their lifetime.  -Anyone with a FH of AAA  -Anyone recommended for screening by USPSTF

## 2017-08-01 NOTE — PROGRESS NOTES
1. Have you been to the ER, urgent care clinic since your last visit? Hospitalized since your last visit?no    2. Have you seen or consulted any other health care providers outside of the [address] [place] [address] since your last visit? Include any pap smears or colon screening.  no

## 2017-08-08 RX ORDER — GABAPENTIN 600 MG/1
TABLET ORAL
Qty: 540 TAB | Refills: 2 | Status: SHIPPED | OUTPATIENT
Start: 2017-08-08 | End: 2018-06-25 | Stop reason: SDUPTHER

## 2017-08-08 RX ORDER — VALSARTAN AND HYDROCHLOROTHIAZIDE 160; 25 MG/1; MG/1
TABLET ORAL
Qty: 90 TAB | Refills: 2 | Status: SHIPPED | OUTPATIENT
Start: 2017-08-08 | End: 2018-05-31 | Stop reason: SDUPTHER

## 2017-09-27 RX ORDER — INSULIN DETEMIR 100 [IU]/ML
INJECTION, SOLUTION SUBCUTANEOUS
Qty: 1500 ML | Refills: 3 | Status: SHIPPED | OUTPATIENT
Start: 2017-09-27 | End: 2019-04-24 | Stop reason: SDUPTHER

## 2017-12-06 ENCOUNTER — OFFICE VISIT (OUTPATIENT)
Dept: INTERNAL MEDICINE CLINIC | Age: 67
End: 2017-12-06

## 2017-12-06 VITALS
HEIGHT: 66 IN | SYSTOLIC BLOOD PRESSURE: 127 MMHG | RESPIRATION RATE: 18 BRPM | DIASTOLIC BLOOD PRESSURE: 77 MMHG | TEMPERATURE: 97.8 F | OXYGEN SATURATION: 96 % | HEART RATE: 56 BPM | BODY MASS INDEX: 34.36 KG/M2 | WEIGHT: 213.8 LBS

## 2017-12-06 DIAGNOSIS — I25.10 CORONARY ARTERY DISEASE INVOLVING NATIVE CORONARY ARTERY OF NATIVE HEART WITHOUT ANGINA PECTORIS: ICD-10-CM

## 2017-12-06 DIAGNOSIS — I10 ESSENTIAL HYPERTENSION: ICD-10-CM

## 2017-12-06 DIAGNOSIS — Z23 ENCOUNTER FOR IMMUNIZATION: ICD-10-CM

## 2017-12-06 DIAGNOSIS — Z12.11 COLON CANCER SCREENING: ICD-10-CM

## 2017-12-06 DIAGNOSIS — E78.00 HYPERCHOLESTEREMIA: ICD-10-CM

## 2017-12-06 DIAGNOSIS — E11.42 TYPE 2 DIABETES MELLITUS WITH DIABETIC POLYNEUROPATHY, UNSPECIFIED LONG TERM INSULIN USE STATUS: Primary | ICD-10-CM

## 2017-12-06 NOTE — MR AVS SNAPSHOT
Visit Information Date & Time Provider Department Dept. Phone Encounter #  
 12/6/2017  9:45 AM Chandler Poe, [address] [id number] Follow-up Instructions Return in about 4 months (around 4/6/2018) for DM. Upcoming Health Maintenance Date Due COLONOSCOPY 5/19/1967 ZOSTER VACCINE AGE 60> 3/19/2009 EYE EXAM RETINAL OR DILATED Q1 10/19/2016 Influenza Age 5 to Adult 8/1/2017 MICROALBUMIN Q1 8/19/2017 HEMOGLOBIN A1C Q6M 9/28/2017 GLAUCOMA SCREENING Q2Y 10/19/2017 FOOT EXAM Q1 3/28/2018 LIPID PANEL Q1 3/28/2018 Pneumococcal 65+ Low/Medium Risk (2 of 2 - PPSV23) 7/16/2018 MEDICARE YEARLY EXAM 8/2/2018 DTaP/Tdap/Td series (2 - Td) 5/29/2025 Allergies as of 12/6/2017  Review Complete On: 12/6/2017 By: Chandler Poe MD  
 No Known Allergies Current Immunizations  Reviewed on 3/28/2017 Name Date Influenza Vaccine 9/2/2015, 9/16/2014 Influenza Vaccine (Quad) PF  Incomplete Influenza Vaccine PF 10/30/2013 Pneumococcal Conjugate (PCV-13) 9/28/2015 Pneumococcal Polysaccharide (PPSV-23) 7/16/2013  6:31 PM  
 Tdap 5/29/2015 Not reviewed this visit You Were Diagnosed With   
  
 Codes Comments Type 2 diabetes mellitus with diabetic polyneuropathy, unspecified long term insulin use status ([facility]    -  Primary ICD-10-CM: E11.42 
ICD-9-CM: 250.60, 357.2 Essential hypertension     ICD-10-CM: I10 
ICD-9-CM: 401.9 Hypercholesteremia     ICD-10-CM: E78.00 ICD-9-CM: 272.0 Coronary artery disease involving native coronary artery of native heart without angina pectoris     ICD-10-CM: I25.10 ICD-9-CM: 414.01 Colon cancer screening     ICD-10-CM: Z12.11 ICD-9-CM: V76.51 Encounter for immunization     ICD-10-CM: H55 ICD-9-CM: V03.89 Vitals BP Pulse Temp Resp Height(growth percentile) Weight(growth percentile) 127/77 (BP 1 Location: Left arm, BP Patient Position: Sitting) (!) 56 97.8 °F (36.6 °C) (Oral) 18 5' 6\" (1.676 m) 213 lb 12.8 oz (97 kg) SpO2 BMI Smoking Status 96% 34.51 kg/m2 Never Smoker Vitals History BMI and BSA Data Body Mass Index Body Surface Area 34.51 kg/m 2 2.13 m 2 Preferred Pharmacy Pharmacy Name Phone [address] [facility] [phone] Your Updated Medication List  
  
   
This list is accurate as of: 12/6/17 10:30 AM.  Always use your most recent med list.  
  
  
  
  
 aspirin 81 mg chewable tablet Take 1 Tab by mouth daily. clopidogrel 75 mg Tab Commonly known as:  PLAVIX Take 1 Tab by mouth daily. Diabetic Shoes Patient has diabetic foot neuropathy. He needs diabetic shoes to help with his diagnosis. Diabetic neuropathy: Code 250.60  
  
 gabapentin 600 mg tablet Commonly known as:  NEURONTIN  
TAKE 2 TABLETS THREE TIMES A DAY  
  
 * Insulin Needles (Disposable) 31 gauge x 1/4\" Ndle Commonly known as:  Beauty Od[place] Use as directed * Insulin Needles (Disposable) 31 gauge x 3/16\" Ndle Commonly known as:  BD INSULIN PEN NEEDLE UF MINI Use as directed with Liset to inject insulin as directed Lancets Misc Commonly known as:  Meryl Main Test TID LEVEMIR FLEXTOUCH 100 unit/mL (3 mL) Inpn Generic drug:  insulin detemir INJECT 45 UNITS UNDER THE SKIN NIGHTLY  
  
 metFORMIN 500 mg tablet Commonly known as:  GLUCOPHAGE  
TAKE 1 TABLET TWICE A DAY WITH MEALS  
  
 metoprolol tartrate 25 mg tablet Commonly known as:  LOPRESSOR Take 1 Tab by mouth two (2) times a day. pneumococcal 13 noman conj dip 0.5 mL Syrg injection Commonly known as:  PREVNAR 13 (PF)  
0.5 mL by IntraMUSCular route PRIOR TO DISCHARGE for 1 dose. pravastatin 40 mg tablet Commonly known as:  PRAVACHOL  
 Take 40 mg by mouth nightly. Indications: HYPERCHOLESTEROLEMIA  
  
 simvastatin 10 mg tablet Commonly known as:  ZOCOR Take  by mouth nightly. traZODone 50 mg tablet Commonly known as:  Orma Paddy Take 1 Tab by mouth nightly. valsartan-hydroCHLOROthiazide 160-25 mg per tablet Commonly known as:  DIOVAN-HCT  
TAKE 1 TABLET DAILY * Notice: This list has 2 medication(s) that are the same as other medications prescribed for you. Read the directions carefully, and ask your doctor or other care provider to review them with you. We Performed the Following ADMIN INFLUENZA VIRUS VAC [ \[facility]\""] INFLUENZA VIRUS VAC QUAD,SPLIT,PRESV FREE SYRINGE IM [id number] CPT(R)] OCCULT BLOOD, IMMUNOASSAY (FIT) [id number] CPT(R)] Follow-up Instructions Return in about 4 months (around 4/6/2018) for DM. Introducing [facility] SERVICES! Dear Kaycee Wei: 
Thank you for requesting a [employer] account. Our records indicate that you already have an active [employer] account. You can access your account anytime at [email or web address] Did you know that you can access your hospital and ER discharge instructions at any time in [employer]? You can also review all of your test results from your hospital stay or ER visit. Additional Information If you have questions, please visit the Frequently Asked Questions section of the [employer] website at [email or web address] Remember, [employer] is NOT to be used for urgent needs. For medical emergencies, dial 911. Now available from your iPhone and Android! Please provide this summary of care documentation to your next provider. Your primary care clinician is listed as [place]. If you have any questions after today's visit, please call [phone].

## 2017-12-06 NOTE — PROGRESS NOTES
SUBJECTIVE:   Mr. Joya Price is a 75 y.o. male who is here for follow up of routine medical issues. Previously saw Dr. Keira Philippe. Chief Complaint   Patient presents with    Diabetes     pt here today for routine f.u    Hypertension    Immunization/Injection     pt wants a flu shot        Sleeping better. Gets 5 1/2 to 6 hours. Still gets fatigue in the middle of the afternoon. Seeing Dr. Jessi Marrero for what sounds like retinopathy; getting \"that needle in the eye. \"   Glc at home runs 130s - 140s. He sees Dr. Amrita Meadows for CAD. Denies CP. He has severe neuralgias in feet, controlled by gabapentin. Dr. Esvin Cole did EMG/NCV in Jan 2014. Last colonoscopy more than 10 years ago. At this time, he is otherwise doing well and has brought no other complaints to my attention today. For a list of the medical issues addressed today, see the assessment and plan below. PMH:   Past Medical History:   Diagnosis Date    CAD (coronary artery disease)     Diabetes ([facility]     Heart disease     Hypertension        Past Surgical History:   Procedure Laterality Date    CARDIAC SURG PROCEDURE UNLIST      HX HEMORRHOIDECTOMY         All: He has No Known Allergies. Current Outpatient Prescriptions   Medication Sig    LEVEMIR FLEXTOUCH 100 unit/mL (3 mL) inpn INJECT 45 UNITS UNDER THE SKIN NIGHTLY    valsartan-hydroCHLOROthiazide (DIOVAN-HCT) 160-25 mg per tablet TAKE 1 TABLET DAILY    gabapentin (NEURONTIN) 600 mg tablet TAKE 2 TABLETS THREE TIMES A DAY    Insulin Needles, Disposable, (BD INSULIN PEN NEEDLE UF MINI) 31 gauge x 3/16\" ndle Use as directed with Liset to inject insulin as directed    metFORMIN (GLUCOPHAGE) 500 mg tablet TAKE 1 TABLET TWICE A DAY WITH MEALS    Insulin Needles, Disposable, (UNIFINE PENTIPS) 31 gauge x 1/4\" ndle Use as directed    traZODone (DESYREL) 50 mg tablet Take 1 Tab by mouth nightly.  simvastatin (ZOCOR) 10 mg tablet Take  by mouth nightly.     Diabetic Shoes XX Patient has diabetic foot neuropathy. He needs diabetic shoes to help with his diagnosis. Diabetic neuropathy: Code 250.60    Lancets (MICROLET LANCET) misc Test TID    aspirin 81 mg chewable tablet Take 1 Tab by mouth daily.  clopidogrel (PLAVIX) 75 mg tablet Take 1 Tab by mouth daily.  metoprolol (LOPRESSOR) 25 mg tablet Take 1 Tab by mouth two (2) times a day.  pneumococcal 13 noman conj dip (PREVNAR 13, PF,) 0.5 mL syrg injection 0.5 mL by IntraMUSCular route PRIOR TO DISCHARGE for 1 dose.  pravastatin (PRAVACHOL) 40 mg tablet Take 40 mg by mouth nightly. Indications: HYPERCHOLESTEROLEMIA     No current facility-administered medications for this visit. FH: His family history includes Heart Disease in his paternal uncle; and Hypertension in his paternal uncle. Mother  of cancer of some type. Father is living. His brother had prostate cancer. SH: . Retired from [employer], and from [email or web address], and from [employer]. He reports that he has never smoked. He has never used smokeless tobacco. He reports that he does not drink alcohol or use illicit drugs. ROS: See above; Complete ROS otherwise negative. OBJECTIVE:   Vitals:   Visit Vitals    /77 (BP 1 Location: Left arm, BP Patient Position: Sitting)    Pulse (!) 56    Temp 97.8 °F (36.6 °C) (Oral)    Resp 18    Ht 5' 6\" (1.676 m)    Wt 213 lb 12.8 oz (97 kg)    SpO2 96%    BMI 34.51 kg/m2      Gen: Pleasant 75 y.o.  male in NAD. HEENT: PERRLA. EOMI. OP moist and pink. Neck: Supple. No LAD. HEART: RRR, No M/G/R.    LUNGS: CTAB No W/R. ABDOMEN: S, NT, ND, BS+. EXTREMITIES: Warm. No C/C/E.  MUSCULOSKELETAL: Normal ROM, muscle strength 5/5 all groups. NEURO: Alert and oriented x 3. Cranial nerves grossly intact. No focal sensory or motor deficits noted. SKIN: Warm. Dry. No rashes or other lesions noted.     Lab Results   Component Value Date/Time    Sodium 141 2017 09:47 AM    Potassium 4.9 2017 09:47 AM    Chloride 99 03/28/2017 09:47 AM    CO2 21 03/28/2017 09:47 AM    Anion gap 12 07/17/2013 03:25 AM    Glucose 247 03/28/2017 09:47 AM    BUN 17 03/28/2017 09:47 AM    Creatinine 0.96 03/28/2017 09:47 AM    BUN/Creatinine ratio 18 03/28/2017 09:47 AM    GFR est AA 94 03/28/2017 09:47 AM    GFR est non-AA 81 03/28/2017 09:47 AM    Calcium 9.3 03/28/2017 09:47 AM    Bilirubin, total 0.5 03/28/2017 09:47 AM    ALT (SGPT) 16 03/28/2017 09:47 AM    AST (SGOT) 15 03/28/2017 09:47 AM    Alk. phosphatase 67 03/28/2017 09:47 AM    Protein, total 6.5 03/28/2017 09:47 AM    Albumin 4.5 03/28/2017 09:47 AM    Globulin 3.1 07/14/2013 09:50 AM    A-G Ratio 2.3 03/28/2017 09:47 AM       Lab Results   Component Value Date/Time    Cholesterol, total 170 03/28/2017 09:47 AM    HDL Cholesterol 40 03/28/2017 09:47 AM    LDL, calculated 104 03/28/2017 09:47 AM    Triglyceride 132 03/28/2017 09:47 AM    CHOL/HDL Ratio 5.1 07/15/2013 09:10 AM        Lab Results   Component Value Date/Time    Hemoglobin A1c 9.4 03/28/2017 09:47 AM       Lab Results   Component Value Date/Time    WBC 7.0 03/28/2017 09:47 AM    HGB 15.8 03/28/2017 09:47 AM    HCT 46.8 03/28/2017 09:47 AM    PLATELET 719 20/26/4854 09:47 AM    MCV 93 03/28/2017 09:47 AM         ASSESSMENT/ PLAN: Sultana Weir was seen today for follow up. DM (diabetes mellitus): Not controlled at last check. What he is getting at home isn't bad. Continue insulin. Seeing Dr. Jean Pierre Farr with Endocrinology.   - HEMOGLOBIN D7G  - METABOLIC PANEL, COMPREHENSIVE  - LIPID PANEL    Midday fatigue: Check glucose. Insomnia: Improved. trazodone. Anxiety: Bedtime trazodone. Consider other agents down the road. HTN (hypertension): Borderline BP; watch this. - METABOLIC PANEL, COMPREHENSIVE  - LIPID PANEL  - CBC WITH AUTOMATED DIFF    Hypercholesteremia  - LIPID PANEL    Coronary artery disease: No CP today. F/U with Cardiology. Diabetic neuropathy: Ongoing, stable. Gapabentin helps. - gabapentin (NEURONTIN) 600 mg tablet; Take 2 tablets by mouth three (3) times daily. Colon cancer screening: Previously have referred to GI. He is disinclined to follow through--\"I heard too many bad stories\"; I reassured him that the procedure saves far more lives than it harms. Follow-up Disposition:  Return in about 4 months (around 4/6/2018) for DM. I have reviewed the patient's medications and risks/side effects/benefits were discussed. Diagnosis(-es) explained to patient and questions answered. Literature provided where appropriate.

## 2017-12-06 NOTE — PROGRESS NOTES
1. Have you been to the ER, urgent care clinic since your last visit? Hospitalized since your last visit?no    2. Have you seen or consulted any other health care providers outside of the [address] [place] [address] since your last visit? Include any pap smears or colon screening.  no

## 2017-12-20 ENCOUNTER — PATIENT OUTREACH (OUTPATIENT)
Dept: INTERNAL MEDICINE CLINIC | Age: 67
End: 2017-12-20

## 2017-12-20 NOTE — PROGRESS NOTES
Patient identified on Diabetic Bundle Registry for A1c >9%. Left voice message for Patient asking for a return call regarding scheduling an appointment with the Interprofessional Diabetes Team.    Lab Results   Component Value Date/Time    Hemoglobin A1c 9.4 03/28/2017 09:47 AM    Hemoglobin A1c 9.3 08/19/2016 08:20 AM    Hemoglobin A1c 7.6 05/29/2015 10:01 AM     Key Antihyperglycemic Medications             LEVEMIR FLEXTOUCH 100 unit/mL (3 mL) inpn INJECT 45 UNITS UNDER THE SKIN NIGHTLY    metFORMIN (GLUCOPHAGE) 500 mg tablet TAKE 1 TABLET TWICE A DAY WITH MEALS        No future appointments.      Last Appointment My Department:  12/6/2017

## 2018-01-03 NOTE — TELEPHONE ENCOUNTER
Patient's wife, Elo Lamar states that refill for [facility] needs to be documented as 90 day supplies thru [employer]. Please call when corrected.  Thank you

## 2018-01-04 RX ORDER — PEN NEEDLE, DIABETIC 31 GX3/16"
NEEDLE, DISPOSABLE MISCELLANEOUS
Qty: 100 PEN NEEDLE | Refills: 6 | Status: SHIPPED | OUTPATIENT
Start: 2018-01-04 | End: 2018-03-03

## 2018-04-13 ENCOUNTER — HOSPITAL ENCOUNTER (OUTPATIENT)
Dept: LAB | Age: 68
Discharge: HOME | End: 2018-04-13
Payer: MEDICARE

## 2018-04-13 ENCOUNTER — OFFICE VISIT (OUTPATIENT)
Dept: INTERNAL MEDICINE CLINIC | Age: 68
End: 2018-04-13

## 2018-04-13 VITALS
DIASTOLIC BLOOD PRESSURE: 74 MMHG | HEIGHT: 66 IN | TEMPERATURE: 97.8 F | RESPIRATION RATE: 16 BRPM | SYSTOLIC BLOOD PRESSURE: 115 MMHG | OXYGEN SATURATION: 97 % | HEART RATE: 56 BPM | BODY MASS INDEX: 34.23 KG/M2 | WEIGHT: 213 LBS

## 2018-04-13 DIAGNOSIS — I10 ESSENTIAL HYPERTENSION: ICD-10-CM

## 2018-04-13 DIAGNOSIS — E78.00 HYPERCHOLESTEREMIA: ICD-10-CM

## 2018-04-13 DIAGNOSIS — E11.42 TYPE 2 DIABETES MELLITUS WITH DIABETIC POLYNEUROPATHY, UNSPECIFIED WHETHER LONG TERM INSULIN USE (HCC): Primary | ICD-10-CM

## 2018-04-13 DIAGNOSIS — I25.10 CORONARY ARTERY DISEASE INVOLVING NATIVE CORONARY ARTERY OF NATIVE HEART WITHOUT ANGINA PECTORIS: ICD-10-CM

## 2018-04-13 PROCEDURE — 36415 COLL VENOUS BLD VENIPUNCTURE: CPT

## 2018-04-13 PROCEDURE — 80053 COMPREHEN METABOLIC PANEL: CPT

## 2018-04-13 PROCEDURE — 82043 UR ALBUMIN QUANTITATIVE: CPT

## 2018-04-13 PROCEDURE — 85025 COMPLETE CBC W/AUTO DIFF WBC: CPT

## 2018-04-13 PROCEDURE — 80061 LIPID PANEL: CPT

## 2018-04-13 PROCEDURE — 83036 HEMOGLOBIN GLYCOSYLATED A1C: CPT

## 2018-04-13 NOTE — MR AVS SNAPSHOT
[address] [facility] 
[phone] Patient: Kenny Never. MRN: [id number] :1950 Visit Information Date & Time Provider Department Dept. Phone Encounter #  
 2018  9:30 AM Durene Homans, [address] [facility] [phone] [id number] Follow-up Instructions Return in about 4 months (around 2018) for DM. Upcoming Health Maintenance Date Due COLONOSCOPY 1967 ZOSTER VACCINE AGE 60> 3/19/2009 EYE EXAM RETINAL OR DILATED Q1 10/19/2016 MICROALBUMIN Q1 2017 HEMOGLOBIN A1C Q6M 2017 GLAUCOMA SCREENING Q2Y 10/19/2017 FOOT EXAM Q1 3/28/2018 LIPID PANEL Q1 3/28/2018 Pneumococcal 65+ Low/Medium Risk (2 of 2 - PPSV23) 2018 MEDICARE YEARLY EXAM 2018 DTaP/Tdap/Td series (2 - Td) 2025 Allergies as of 2018  Review Complete On: 2018 By: Parker Atkins No Known Allergies Current Immunizations  Reviewed on 3/28/2017 Name Date Influenza Vaccine 2015, 2014 Influenza Vaccine (Quad) PF 2017 10:55 AM  
 Influenza Vaccine PF 10/30/2013 Pneumococcal Conjugate (PCV-13) 2015 Pneumococcal Polysaccharide (PPSV-23) 2013  6:31 PM  
 Tdap 2015 Not reviewed this visit You Were Diagnosed With   
  
 Codes Comments Type 2 diabetes mellitus with diabetic polyneuropathy, unspecified whether long term insulin use ([facility]    -  Primary ICD-10-CM: E11.42 
ICD-9-CM: 250.60, 357.2 Essential hypertension     ICD-10-CM: I10 
ICD-9-CM: 401.9 Coronary artery disease involving native coronary artery of native heart without angina pectoris     ICD-10-CM: I25.10 ICD-9-CM: 414.01 Hypercholesteremia     ICD-10-CM: E78.00 ICD-9-CM: 272.0 Vitals BP Pulse Temp Resp Height(growth percentile) Weight(growth percentile) 115/74 (BP 1 Location: Left arm, BP Patient Position: Sitting) (!) 56 97.8 °F (36.6 °C) (Oral) 16 5' 6\" (1.676 m) 213 lb (96.6 kg) SpO2 BMI Smoking Status 97% 34.38 kg/m2 Never Smoker Vitals History BMI and BSA Data Body Mass Index Body Surface Area  
 34.38 kg/m 2 2.12 m 2 Preferred Pharmacy Pharmacy Name Phone [facility], [facility] [phone] Your Updated Medication List  
  
   
This list is accurate as of 4/13/18  9:56 AM.  Always use your most recent med list.  
  
  
  
  
 aspirin 81 mg chewable tablet Take 1 Tab by mouth daily. clopidogrel 75 mg Tab Commonly known as:  PLAVIX Take 1 Tab by mouth daily. Diabetic Shoes Patient has diabetic foot neuropathy. He needs diabetic shoes to help with his diagnosis. Diabetic neuropathy: Code 250.60  
  
 gabapentin 600 mg tablet Commonly known as:  NEURONTIN  
TAKE 2 TABLETS THREE TIMES A DAY Insulin Needles (Disposable) 31 gauge x 1/4\" Ndle Commonly known as:  Gigi Toledo Use as directed Lancets Misc Commonly known as:  Bree Fernandes Test TID LEVEMIR FLEXTOUCH U-100 INSULN 100 unit/mL (3 mL) Inpn Generic drug:  insulin detemir U-100 INJECT 45 UNITS UNDER THE SKIN NIGHTLY  
  
 metFORMIN 500 mg tablet Commonly known as:  GLUCOPHAGE  
TAKE 1 TABLET TWICE A DAY WITH MEALS  
  
 metoprolol tartrate 25 mg tablet Commonly known as:  LOPRESSOR Take 1 Tab by mouth two (2) times a day. pneumococcal 13 noman conj dip 0.5 mL Syrg injection Commonly known as:  PREVNAR 13 (PF)  
0.5 mL by IntraMUSCular route PRIOR TO DISCHARGE for 1 dose. pravastatin 40 mg tablet Commonly known as:  PRAVACHOL Take 40 mg by mouth nightly. Indications: HYPERCHOLESTEROLEMIA  
  
 simvastatin 10 mg tablet Commonly known as:  ZOCOR Take  by mouth nightly. traZODone 50 mg tablet Commonly known as:  Afshin Rosenthal Take 1 Tab by mouth nightly. valsartan-hydroCHLOROthiazide 160-25 mg per tablet Commonly known as:  DIOVAN-HCT  
TAKE 1 TABLET DAILY We Performed the Following CBC WITH AUTOMATED DIFF [82924 CPT(R)] HEMOGLOBIN A1C WITH EAG [43994 CPT(R)]  DIABETES FOOT EXAM [HM7 Custom] LIPID PANEL [04772 CPT(R)] METABOLIC PANEL, COMPREHENSIVE [94010 CPT(R)] MICROALBUMIN, UR, RAND [id number] CPT(R)] Follow-up Instructions Return in about 4 months (around 8/13/2018) for DM. Introducing hospitals & HEALTH SERVICES! Dear Xin Terry: 
Thank you for requesting a [employer] account. Our records indicate that you already have an active [employer] account. You can access your account anytime at [email or web address] Did you know that you can access your hospital and ER discharge instructions at any time in [employer]? You can also review all of your test results from your hospital stay or ER visit. Additional Information If you have questions, please visit the Frequently Asked Questions section of the [employer] website at [email or web address] Remember, [employer] is NOT to be used for urgent needs. For medical emergencies, dial 911. Now available from your iPhone and Android! Please provide this summary of care documentation to your next provider. Your primary care clinician is listed as [place]. If you have any questions after today's visit, please call [phone].

## 2018-04-13 NOTE — PROGRESS NOTES
SUBJECTIVE:   Mr. Quang Zuleta. is a 75 y.o. male who is here for follow up of routine medical issues. Previously saw Dr. Violeta Morley. Chief Complaint   Patient presents with    Diabetes     pt here today for routine f.u    Hypertension       Sleeping better. Gets 5 1/2 to 6 hours. Still gets fatigue in the middle of the afternoon. Still seeing Dr. Mey Bentley for what sounds like retinopathy; getting \"that needle in the eye. \"   Glc at home runs 130s - 140s. He sees Dr. Jaime Howard for CAD. Denies CP. He has severe neuralgias in feet, controlled by gabapentin. Dr. Bola Martins did EMG/NCV in Jan 2014. Last colonoscopy more than 10 years ago. At this time, he is otherwise doing well and has brought no other complaints to my attention today. For a list of the medical issues addressed today, see the assessment and plan below. PMH:   Past Medical History:   Diagnosis Date    CAD (coronary artery disease)     Diabetes ([facility]     Heart disease     Hypertension        Past Surgical History:   Procedure Laterality Date    CARDIAC SURG PROCEDURE UNLIST      HX HEMORRHOIDECTOMY         All: He has No Known Allergies. Current Outpatient Prescriptions   Medication Sig    LEVEMIR FLEXTOUCH 100 unit/mL (3 mL) inpn INJECT 45 UNITS UNDER THE SKIN NIGHTLY    valsartan-hydroCHLOROthiazide (DIOVAN-HCT) 160-25 mg per tablet TAKE 1 TABLET DAILY    gabapentin (NEURONTIN) 600 mg tablet TAKE 2 TABLETS THREE TIMES A DAY    metFORMIN (GLUCOPHAGE) 500 mg tablet TAKE 1 TABLET TWICE A DAY WITH MEALS    Insulin Needles, Disposable, (UNIFINE PENTIPS) 31 gauge x 1/4\" ndle Use as directed    traZODone (DESYREL) 50 mg tablet Take 1 Tab by mouth nightly.  simvastatin (ZOCOR) 10 mg tablet Take  by mouth nightly.  Diabetic Shoes XX Patient has diabetic foot neuropathy. He needs diabetic shoes to help with his diagnosis.       Diabetic neuropathy: Code 250.60    Lancets (MICROLET LANCET) misc Test TID    aspirin 81 mg chewable tablet Take 1 Tab by mouth daily.  clopidogrel (PLAVIX) 75 mg tablet Take 1 Tab by mouth daily.  metoprolol (LOPRESSOR) 25 mg tablet Take 1 Tab by mouth two (2) times a day.  pneumococcal 13 noman conj dip (PREVNAR 13, PF,) 0.5 mL syrg injection 0.5 mL by IntraMUSCular route PRIOR TO DISCHARGE for 1 dose.  pravastatin (PRAVACHOL) 40 mg tablet Take 40 mg by mouth nightly. Indications: HYPERCHOLESTEROLEMIA     No current facility-administered medications for this visit. FH: His family history includes Heart Disease in his paternal uncle; and Hypertension in his paternal uncle. Mother  of cancer of some type. Father is living. His brother had prostate cancer. SH: . Retired from [employer], and from [employer], and from [employer]. He reports that he has never smoked. He has never used smokeless tobacco. He reports that he does not drink alcohol or use illicit drugs. ROS: See above; Complete ROS otherwise negative. OBJECTIVE:   Vitals:   Visit Vitals    /74 (BP 1 Location: Left arm, BP Patient Position: Sitting)    Pulse (!) 56    Temp 97.8 °F (36.6 °C) (Oral)    Resp 16    Ht 5' 6\" (1.676 m)    Wt 213 lb (96.6 kg)    SpO2 97%    BMI 34.38 kg/m2      Gen: Pleasant 75 y.o.  male in NAD. HEENT: PERRLA. EOMI. OP moist and pink. Neck: Supple. No LAD. HEART: RRR, No M/G/R.    LUNGS: CTAB No W/R. ABDOMEN: S, NT, ND, BS+. EXTREMITIES: Warm. No C/C/E.  MUSCULOSKELETAL: Normal ROM, muscle strength 5/5 all groups. NEURO: Alert and oriented x 3. Cranial nerves grossly intact. No focal sensory or motor deficits noted. SKIN: Warm. Dry. No rashes or other lesions noted.     Lab Results   Component Value Date/Time    Sodium 141 2017 09:47 AM    Potassium 4.9 2017 09:47 AM    Chloride 99 2017 09:47 AM    CO2 21 2017 09:47 AM    Anion gap 12 2013 03:25 AM    Glucose 247 (H) 2017 09:47 AM    BUN 17 2017 09:47 AM    Creatinine 0.96 03/28/2017 09:47 AM    BUN/Creatinine ratio 18 03/28/2017 09:47 AM    GFR est AA 94 03/28/2017 09:47 AM    GFR est non-AA 81 03/28/2017 09:47 AM    Calcium 9.3 03/28/2017 09:47 AM    Bilirubin, total 0.5 03/28/2017 09:47 AM    ALT (SGPT) 16 03/28/2017 09:47 AM    AST (SGOT) 15 03/28/2017 09:47 AM    Alk. phosphatase 67 03/28/2017 09:47 AM    Protein, total 6.5 03/28/2017 09:47 AM    Albumin 4.5 03/28/2017 09:47 AM    Globulin 3.1 07/14/2013 09:50 AM    A-G Ratio 2.3 (H) 03/28/2017 09:47 AM       Lab Results   Component Value Date/Time    Cholesterol, total 170 03/28/2017 09:47 AM    HDL Cholesterol 40 03/28/2017 09:47 AM    LDL, calculated 104 (H) 03/28/2017 09:47 AM    Triglyceride 132 03/28/2017 09:47 AM    CHOL/HDL Ratio 5.1 (H) 07/15/2013 09:10 AM        Lab Results   Component Value Date/Time    Hemoglobin A1c 9.4 (H) 03/28/2017 09:47 AM       Lab Results   Component Value Date/Time    WBC 7.0 03/28/2017 09:47 AM    HGB 15.8 03/28/2017 09:47 AM    HCT 46.8 03/28/2017 09:47 AM    PLATELET 989 24/82/7177 09:47 AM    MCV 93 03/28/2017 09:47 AM         ASSESSMENT/ PLAN: Anahi Sarabia was seen today for follow up. DM (diabetes mellitus): Not controlled at last check. Continue insulin. Seeing Dr. Jes Blount with Endocrinology.   - HEMOGLOBIN Z9I  - METABOLIC PANEL, COMPREHENSIVE  - LIPID PANEL    Insomnia: Improved. trazodone. Anxiety: Bedtime trazodone. Consider other agents down the road. HTN (hypertension): Borderline BP; watch this. - METABOLIC PANEL, COMPREHENSIVE  - LIPID PANEL  - CBC WITH AUTOMATED DIFF    Hypercholesteremia  - LIPID PANEL    Coronary artery disease: No CP today. F/U with Cardiology. Diabetic neuropathy: Ongoing, stable. Gapabentin helps. - gabapentin (NEURONTIN) 600 mg tablet; Take 2 tablets by mouth three (3) times daily. Colon cancer screening: Previously have referred to GI. He is disinclined to follow through--\"I heard too many bad stories\";  I reassured him that the procedure saves far more lives than it harms. Follow-up Disposition:  Return in about 4 months (around 8/13/2018) for DM. I have reviewed the patient's medications and risks/side effects/benefits were discussed. Diagnosis(-es) explained to patient and questions answered. Literature provided where appropriate.

## 2018-04-14 LAB
ALBUMIN SERPL-MCNC: 4 G/DL (ref 3.6–4.8)
ALBUMIN/GLOB SERPL: 2.4 {RATIO} (ref 1.2–2.2)
ALP SERPL-CCNC: 59 IU/L (ref 39–117)
ALT SERPL-CCNC: 19 IU/L (ref 0–44)
AST SERPL-CCNC: 18 IU/L (ref 0–40)
BASOPHILS # BLD AUTO: 0 X10E3/UL (ref 0–0.2)
BASOPHILS NFR BLD AUTO: 1 %
BILIRUB SERPL-MCNC: 0.5 MG/DL (ref 0–1.2)
BUN SERPL-MCNC: 16 MG/DL (ref 8–27)
BUN/CREAT SERPL: 15 (ref 10–24)
CALCIUM SERPL-MCNC: 9.1 MG/DL (ref 8.6–10.2)
CHLORIDE SERPL-SCNC: 101 MMOL/L (ref 96–106)
CHOLEST SERPL-MCNC: 155 MG/DL (ref 100–199)
CO2 SERPL-SCNC: 25 MMOL/L (ref 18–29)
CREAT SERPL-MCNC: 1.04 MG/DL (ref 0.76–1.27)
EOSINOPHIL # BLD AUTO: 0.1 X10E3/UL (ref 0–0.4)
EOSINOPHIL NFR BLD AUTO: 2 %
ERYTHROCYTE [DISTWIDTH] IN BLOOD BY AUTOMATED COUNT: 13.6 % (ref 12.3–15.4)
EST. AVERAGE GLUCOSE BLD GHB EST-MCNC: 258 MG/DL
GFR SERPLBLD CREATININE-BSD FMLA CKD-EPI: 73 ML/MIN/1.73
GFR SERPLBLD CREATININE-BSD FMLA CKD-EPI: 85 ML/MIN/1.73
GLOBULIN SER CALC-MCNC: 1.7 G/DL (ref 1.5–4.5)
GLUCOSE SERPL-MCNC: 110 MG/DL (ref 65–99)
HBA1C MFR BLD: 10.6 % (ref 4.8–5.6)
HCT VFR BLD AUTO: 45.5 % (ref 37.5–51)
HDLC SERPL-MCNC: 39 MG/DL
HGB BLD-MCNC: 15.6 G/DL (ref 13–17.7)
IMM GRANULOCYTES # BLD: 0 X10E3/UL (ref 0–0.1)
IMM GRANULOCYTES NFR BLD: 0 %
LDLC SERPL CALC-MCNC: 96 MG/DL (ref 0–99)
LYMPHOCYTES # BLD AUTO: 2 X10E3/UL (ref 0.7–3.1)
LYMPHOCYTES NFR BLD AUTO: 35 %
MCH RBC QN AUTO: 31 PG (ref 26.6–33)
MCHC RBC AUTO-ENTMCNC: 34.3 G/DL (ref 31.5–35.7)
MCV RBC AUTO: 91 FL (ref 79–97)
MICROALBUMIN UR-MCNC: 32.9 UG/ML
MONOCYTES # BLD AUTO: 0.5 X10E3/UL (ref 0.1–0.9)
MONOCYTES NFR BLD AUTO: 9 %
NEUTROPHILS # BLD AUTO: 3.1 X10E3/UL (ref 1.4–7)
NEUTROPHILS NFR BLD AUTO: 53 %
PLATELET # BLD AUTO: 196 X10E3/UL (ref 150–379)
POTASSIUM SERPL-SCNC: 4.5 MMOL/L (ref 3.5–5.2)
PROT SERPL-MCNC: 5.7 G/DL (ref 6–8.5)
RBC # BLD AUTO: 5.03 X10E6/UL (ref 4.14–5.8)
SODIUM SERPL-SCNC: 142 MMOL/L (ref 134–144)
TRIGL SERPL-MCNC: 98 MG/DL (ref 0–149)
VLDLC SERPL CALC-MCNC: 20 MG/DL (ref 5–40)
WBC # BLD AUTO: 5.8 X10E3/UL (ref 3.4–10.8)

## 2018-04-17 ENCOUNTER — TELEPHONE (OUTPATIENT)
Dept: INTERNAL MEDICINE CLINIC | Age: 68
End: 2018-04-17

## 2018-05-21 RX ORDER — METFORMIN HYDROCHLORIDE 500 MG/1
TABLET ORAL
Qty: 180 TAB | Refills: 2 | Status: SHIPPED | OUTPATIENT
Start: 2018-05-21 | End: 2019-02-07 | Stop reason: SDUPTHER

## 2018-05-21 NOTE — TELEPHONE ENCOUNTER
Pt's wife [phone], requesting a rx refill request for his Metformin for [facility], the one listed in his file     Message copied/pasted from [facility]

## 2018-05-31 RX ORDER — VALSARTAN AND HYDROCHLOROTHIAZIDE 160; 25 MG/1; MG/1
TABLET ORAL
Qty: 90 TAB | Refills: 2 | Status: SHIPPED | OUTPATIENT
Start: 2018-05-31 | End: 2020-06-08 | Stop reason: RX

## 2018-05-31 RX ORDER — PEN NEEDLE, DIABETIC 29 G X1/2"
NEEDLE, DISPOSABLE MISCELLANEOUS
Qty: 100 PEN NEEDLE | Refills: 3 | Status: SHIPPED | OUTPATIENT
Start: 2018-05-31 | End: 2019-05-16 | Stop reason: SDUPTHER

## 2018-05-31 NOTE — TELEPHONE ENCOUNTER
#[phone] both of these need to be requested at 90 day as she only received 30 day for needles the last time. 90 day mail order [facility].

## 2018-06-25 NOTE — TELEPHONE ENCOUNTER
90 day mail order [facility]  Is there anyway to put a cano on this as Lan Cancino states she called for this some time ago. Thanks.

## 2018-06-26 RX ORDER — GABAPENTIN 600 MG/1
TABLET ORAL
Qty: 540 TAB | Refills: 2 | Status: SHIPPED | OUTPATIENT
Start: 2018-06-26 | End: 2019-04-01 | Stop reason: SDUPTHER

## 2018-07-25 ENCOUNTER — TELEPHONE (OUTPATIENT)
Dept: INTERNAL MEDICINE CLINIC | Age: 68
End: 2018-07-25

## 2018-07-25 DIAGNOSIS — Z79.4 TYPE 2 DIABETES MELLITUS WITH DIABETIC POLYNEUROPATHY, WITH LONG-TERM CURRENT USE OF INSULIN (HCC): Primary | ICD-10-CM

## 2018-07-25 DIAGNOSIS — E11.42 TYPE 2 DIABETES MELLITUS WITH DIABETIC POLYNEUROPATHY, WITH LONG-TERM CURRENT USE OF INSULIN (HCC): Primary | ICD-10-CM

## 2018-07-25 DIAGNOSIS — E11.41 DIABETIC MONONEUROPATHY ASSOCIATED WITH TYPE 2 DIABETES MELLITUS (HCC): ICD-10-CM

## 2018-07-25 DIAGNOSIS — E11.42 DIABETIC POLYNEUROPATHY ASSOCIATED WITH TYPE 2 DIABETES MELLITUS (HCC): ICD-10-CM

## 2018-07-25 NOTE — TELEPHONE ENCOUNTER
Bita Landry, wife, requested a call back to discuss a poss Rx for \"Elsa\" due to diabetes.  Best contact [phone].              Copy/paste [employer]

## 2018-08-03 ENCOUNTER — TELEPHONE (OUTPATIENT)
Dept: INTERNAL MEDICINE CLINIC | Age: 68
End: 2018-08-03

## 2018-08-03 RX ORDER — LOSARTAN POTASSIUM AND HYDROCHLOROTHIAZIDE 25; 100 MG/1; MG/1
1 TABLET ORAL DAILY
Qty: 30 TAB | Refills: 5 | Status: SHIPPED | OUTPATIENT
Start: 2018-08-03 | End: 2019-02-07 | Stop reason: SDUPTHER

## 2018-08-03 NOTE — TELEPHONE ENCOUNTER
Patient's wife, Nati Herron states she needs a call back in reference to Valsartan recall & getting New Medication prescribed. Please call.  Thank you

## 2018-09-10 RX ORDER — LOSARTAN POTASSIUM AND HYDROCHLOROTHIAZIDE 25; 100 MG/1; MG/1
1 TABLET ORAL DAILY
Qty: 90 TAB | Refills: 1 | Status: SHIPPED | OUTPATIENT
Start: 2018-09-10 | End: 2019-02-07 | Stop reason: SDUPTHER

## 2018-09-10 NOTE — TELEPHONE ENCOUNTER
Please send the losartan to mail order as they can't get at a regular pharm per insurance. [facility] for 90 day mail order. Please send today. Thanks.

## 2018-09-12 ENCOUNTER — OFFICE VISIT (OUTPATIENT)
Dept: INTERNAL MEDICINE CLINIC | Age: 68
End: 2018-09-12

## 2018-09-12 VITALS
HEART RATE: 55 BPM | OXYGEN SATURATION: 97 % | HEIGHT: 66 IN | TEMPERATURE: 98.6 F | BODY MASS INDEX: 33.38 KG/M2 | SYSTOLIC BLOOD PRESSURE: 114 MMHG | WEIGHT: 207.69 LBS | DIASTOLIC BLOOD PRESSURE: 71 MMHG | RESPIRATION RATE: 14 BRPM

## 2018-09-12 DIAGNOSIS — Z79.4 TYPE 2 DIABETES MELLITUS WITH DIABETIC POLYNEUROPATHY, WITH LONG-TERM CURRENT USE OF INSULIN (HCC): ICD-10-CM

## 2018-09-12 DIAGNOSIS — E78.00 HYPERCHOLESTEREMIA: ICD-10-CM

## 2018-09-12 DIAGNOSIS — Z23 ENCOUNTER FOR IMMUNIZATION: ICD-10-CM

## 2018-09-12 DIAGNOSIS — I25.10 CORONARY ARTERY DISEASE INVOLVING NATIVE CORONARY ARTERY OF NATIVE HEART WITHOUT ANGINA PECTORIS: ICD-10-CM

## 2018-09-12 DIAGNOSIS — Z00.00 MEDICARE ANNUAL WELLNESS VISIT, SUBSEQUENT: Primary | ICD-10-CM

## 2018-09-12 DIAGNOSIS — E11.42 TYPE 2 DIABETES MELLITUS WITH DIABETIC POLYNEUROPATHY, WITH LONG-TERM CURRENT USE OF INSULIN (HCC): ICD-10-CM

## 2018-09-12 DIAGNOSIS — E11.42 DIABETIC POLYNEUROPATHY ASSOCIATED WITH TYPE 2 DIABETES MELLITUS (HCC): ICD-10-CM

## 2018-09-12 DIAGNOSIS — I10 ESSENTIAL HYPERTENSION: ICD-10-CM

## 2018-09-12 NOTE — PROGRESS NOTES
1. Have you been to the ER, urgent care clinic since your last visit? Hospitalized since your last visit?no 2. Have you seen or consulted any other health care providers outside of the [address] [place] [address] since your last visit? Include any pap smears or colon screening.  no

## 2018-09-12 NOTE — MR AVS SNAPSHOT
[address] 
[phone] Patient: Den Arvizu. MRN: [id number] :1950 Visit Information Date & Time Provider Department Dept. Phone Encounter #  
 2018  9:30 AM Dana Ambrocio, [address] [place] [address],[address] [phone] [id number] Follow-up Instructions Return in about 4 months (around 2019) for HTN. Upcoming Health Maintenance Date Due COLONOSCOPY 1968 ZOSTER VACCINE AGE 60> 3/19/2009 EYE EXAM RETINAL OR DILATED Q1 10/19/2016 GLAUCOMA SCREENING Q2Y 10/19/2017 Pneumococcal 65+ Low/Medium Risk (2 of 2 - PPSV23) 2018 Influenza Age 5 to Adult 2018 MEDICARE YEARLY EXAM 2018 HEMOGLOBIN A1C Q6M 10/13/2018 FOOT EXAM Q1 2019 MICROALBUMIN Q1 2019 LIPID PANEL Q1 2019 DTaP/Tdap/Td series (2 - Td) 2025 Allergies as of 2018  Review Complete On: 2018 By: Violeta Breaux No Known Allergies Current Immunizations  Reviewed on 3/28/2017 Name Date Influenza Vaccine 2015, 2014 Influenza Vaccine (Quad) PF 2017 10:55 AM  
 Influenza Vaccine (Tri) Adjuvanted  Incomplete Influenza Vaccine PF 10/30/2013 Pneumococcal Conjugate (PCV-13) 2015 Pneumococcal Polysaccharide (PPSV-23) 2013  6:31 PM  
 Tdap 2015 Not reviewed this visit You Were Diagnosed With   
  
 Codes Comments Diabetic polyneuropathy associated with type 2 diabetes mellitus ([facility] [address]    -  Primary ICD-10-CM: E11.42 
ICD-9-CM: 250.60, 357.2 Encounter for immunization     ICD-10-CM: R04 ICD-9-CM: V03.89 Essential hypertension     ICD-10-CM: I10 
ICD-9-CM: 401.9 Hypercholesteremia     ICD-10-CM: E78.00 ICD-9-CM: 272.0 Coronary artery disease involving native coronary artery of native heart without angina pectoris     ICD-10-CM: I25.10 ICD-9-CM: 414.01 Vitals BP Pulse Temp Resp Height(growth percentile) Weight(growth percentile) 114/71 (BP 1 Location: Left arm, BP Patient Position: Sitting) (!) 55 98.6 °F (37 °C) (Oral) 14 5' 6\" (1.676 m) 207 lb 11 oz (94.2 kg) SpO2 BMI Smoking Status 97% 33.52 kg/m2 Never Smoker Vitals History BMI and BSA Data Body Mass Index Body Surface Area  
 33.52 kg/m 2 2.09 m 2 Preferred Pharmacy Pharmacy Name Phone Giuseppe Dawkins, [facility] [phone] Your Updated Medication List  
  
   
This list is accurate as of 9/12/18 10:08 AM.  Always use your most recent med list.  
  
  
  
  
 aspirin 81 mg chewable tablet Take 1 Tab by mouth daily. clopidogrel 75 mg Tab Commonly known as:  PLAVIX Take 1 Tab by mouth daily. Diabetic Shoes Patient has diabetic foot neuropathy. He needs diabetic shoes to help with his diagnosis. Diabetic neuropathy: Code 250.60  
  
 flash glucose scanning reader Misc Commonly known as:  FREESTYLE LOCO READER Check Blood Glucose Three Times Daily Dx Code:E11.41, E11.42  
  
 flash glucose sensor Kit Commonly known as:  [address] Check Blood Glucose Three Times Daily Dx Code:E11.41, E11.42  
  
 gabapentin 600 mg tablet Commonly known as:  NEURONTIN  
TAKE 2 TABLETS THREE TIMES A DAY Insulin Needles (Disposable) 31 gauge x 1/4\" Ndle Commonly known as:  Chad Capone Use as directed Lancets Misc Commonly known as:  Artur Dey Test TID LEVEMIR FLEXTOUCH U-100 INSULN 100 unit/mL (3 mL) Inpn Generic drug:  insulin detemir U-100 INJECT 45 UNITS UNDER THE SKIN NIGHTLY * losartan-hydroCHLOROthiazide 100-25 mg per tablet Commonly known as:  HYZAAR Take 1 Tab by mouth daily. * losartan-hydroCHLOROthiazide 100-25 mg per tablet Commonly known as:  HYZAAR Take 1 Tab by mouth daily for 90 days. metFORMIN 500 mg tablet Commonly known as:  GLUCOPHAGE  
TAKE 1 TABLET TWICE A DAY WITH MEALS  
  
 metoprolol tartrate 25 mg tablet Commonly known as:  LOPRESSOR Take 1 Tab by mouth two (2) times a day. pneumococcal 13 noman conj dip 0.5 mL Syrg injection Commonly known as:  PREVNAR 13 (PF)  
0.5 mL by IntraMUSCular route PRIOR TO DISCHARGE for 1 dose. pravastatin 40 mg tablet Commonly known as:  PRAVACHOL Take 40 mg by mouth nightly. Indications: HYPERCHOLESTEROLEMIA  
  
 simvastatin 10 mg tablet Commonly known as:  ZOCOR Take  by mouth nightly. traZODone 50 mg tablet Commonly known as:  Emaline Sober Take 1 Tab by mouth nightly. valsartan-hydroCHLOROthiazide 160-25 mg per tablet Commonly known as:  DIOVAN-HCT  
TAKE 1 TABLET DAILY * Notice: This list has 2 medication(s) that are the same as other medications prescribed for you. Read the directions carefully, and ask your doctor or other care provider to review them with you. We Performed the Following ADMIN INFLUENZA VIRUS VAC [ [facility]] INFLUENZA VACCINE INACTIVATED (IIV), SUBUNIT, ADJUVANTED, IM [id number] CPT(R)] Follow-up Instructions Return in about 4 months (around 1/12/2019) for HTN. Patient Instructions Body Mass Index: Care Instructions Your Care Instructions Body mass index (BMI) can help you see if your weight is raising your risk for health problems. It uses a formula to compare how much you weigh with how tall you are. · A BMI lower than 18.5 is considered underweight. · A BMI between 18.5 and 24.9 is considered healthy. · A BMI between 25 and 29.9 is considered overweight. A BMI of 30 or higher is considered obese. If your BMI is in the normal range, it means that you have a lower risk for weight-related health problems.  If your BMI is in the overweight or obese range, you may be at increased risk for weight-related health problems, such as high blood pressure, heart disease, stroke, arthritis or joint pain, and diabetes. If your BMI is in the underweight range, you may be at increased risk for health problems such as fatigue, lower protection (immunity) against illness, muscle loss, bone loss, hair loss, and hormone problems. BMI is just one measure of your risk for weight-related health problems. You may be at higher risk for health problems if you are not active, you eat an unhealthy diet, or you drink too much alcohol or use tobacco products. Follow-up care is a key part of your treatment and safety. Be sure to make and go to all appointments, and call your doctor if you are having problems. It's also a good idea to know your test results and keep a list of the medicines you take. How can you care for yourself at home? · Practice healthy eating habits. This includes eating plenty of fruits, vegetables, whole grains, lean protein, and low-fat dairy. · If your doctor recommends it, get more exercise. Walking is a good choice. Bit by bit, increase the amount you walk every day. Try for at least 30 minutes on most days of the week. · Do not smoke. Smoking can increase your risk for health problems. If you need help quitting, talk to your doctor about stop-smoking programs and medicines. These can increase your chances of quitting for good. · Limit alcohol to 2 drinks a day for men and 1 drink a day for women. Too much alcohol can cause health problems. If you have a BMI higher than 25 · Your doctor may do other tests to check your risk for weight-related health problems. This may include measuring the distance around your waist. A waist measurement of more than 40 inches in men or 35 inches in women can increase the risk of weight-related health problems. · Talk with your doctor about steps you can take to stay healthy or improve your health.  You may need to make lifestyle changes to lose weight and stay healthy, such as changing your diet and getting regular exercise. If you have a BMI lower than 18.5 · Your doctor may do other tests to check your risk for health problems. · Talk with your doctor about steps you can take to stay healthy or improve your health. You may need to make lifestyle changes to gain or maintain weight and stay healthy, such as getting more healthy foods in your diet and doing exercises to build muscle. Where can you learn more? Go to http://tariq-jean-claude.info/. Enter S176 in the search box to learn more about \"Body Mass Index: Care Instructions. \" Current as of: October 13, 2016 Content Version: 11.4 © 3833-9741 [employer]. Care instructions adapted under license by [employer] (which disclaims liability or warranty for this information). If you have questions about a medical condition or this instruction, always ask your healthcare professional. [address] any warranty or liability for your use of this information. Introducing [facility]! Dear John Hoyt: 
Thank you for requesting a DabKick account. Our records indicate that you already have an active DabKick account. You can access your account anytime at [email or web address] Did you know that you can access your hospital and ER discharge instructions at any time in DabKick? You can also review all of your test results from your hospital stay or ER visit. Additional Information If you have questions, please visit the Frequently Asked Questions section of the DabKick website at [email or web address] Remember, DabKick is NOT to be used for urgent needs. For medical emergencies, dial 911. Now available from your iPhone and Android! Please provide this summary of care documentation to your next provider. Your primary care clinician is listed as [place].  If you have any questions after today's visit, please call [phone].

## 2018-09-12 NOTE — PROGRESS NOTES
Well:  
 
 
This is the Subsequent Medicare Annual Wellness Exam, performed 12 months or more after the Initial AWV or the last Subsequent AWV I have reviewed the patient's medical history in detail and updated the computerized patient record. History Past Medical History:  
Diagnosis Date  CAD (coronary artery disease)  Diabetes ([address]  Heart disease  Hypertension Past Surgical History:  
Procedure Laterality Date  CARDIAC SURG PROCEDURE UNLIST  HX HEMORRHOIDECTOMY Current Outpatient Prescriptions Medication Sig Dispense Refill  losartan-hydroCHLOROthiazide (HYZAAR) 100-25 mg per tablet Take 1 Tab by mouth daily for 90 days. 90 Tab 1  
 losartan-hydroCHLOROthiazide (HYZAAR) 100-25 mg per tablet Take 1 Tab by mouth daily. 30 Tab 5  
 flash glucose scanning reader (FREESTYLE LOCO READER) misc Check Blood Glucose Three Times Daily Dx Code:E11.41, E11.42 1 Device 0  
 flash glucose sensor (FREESTYLE LOCO SENSOR) kit Check Blood Glucose Three Times Daily Dx Code:E11.41, E11.42 3 Box 3  
 gabapentin (NEURONTIN) 600 mg tablet TAKE 2 TABLETS THREE TIMES A  Tab 2  
 Insulin Needles, Disposable, (UNIFINE PENTIPS) 31 gauge x 1/4\" ndle Use as directed 100 Pen Needle 3  
 metFORMIN (GLUCOPHAGE) 500 mg tablet TAKE 1 TABLET TWICE A DAY WITH MEALS 180 Tab 2  
 LEVEMIR FLEXTOUCH 100 unit/mL (3 mL) inpn INJECT 45 UNITS UNDER THE SKIN NIGHTLY 1500 mL 3  
 traZODone (DESYREL) 50 mg tablet Take 1 Tab by mouth nightly. 90 Tab 3  pneumococcal 13 noman conj dip (PREVNAR 13, PF,) 0.5 mL syrg injection 0.5 mL by IntraMUSCular route PRIOR TO DISCHARGE for 1 dose. 1 Syringe 0  
 Diabetic Shoes XX Patient has diabetic foot neuropathy. He needs diabetic shoes to help with his diagnosis. Diabetic neuropathy: Code 250.60 1 Device 0  
 Lancets (MICROLET LANCET) misc Test  Each 11  
 pravastatin (PRAVACHOL) 40 mg tablet Take 40 mg by mouth nightly. Indications: HYPERCHOLESTEROLEMIA  aspirin 81 mg chewable tablet Take 1 Tab by mouth daily. 30 Tab 11  
 clopidogrel (PLAVIX) 75 mg tablet Take 1 Tab by mouth daily. 30 Tab 11  
 metoprolol (LOPRESSOR) 25 mg tablet Take 1 Tab by mouth two (2) times a day. 60 Tab 11  
 valsartan-hydroCHLOROthiazide (DIOVAN-HCT) 160-25 mg per tablet TAKE 1 TABLET DAILY 90 Tab 2  
 simvastatin (ZOCOR) 10 mg tablet Take  by mouth nightly. No Known Allergies Family History Problem Relation Age of Onset  Cancer Mother   
  ovarian  Cancer Brother Prostate  Hypertension Paternal Uncle  Heart Disease Paternal Uncle  Alcohol abuse Father  No Known Problems Brother  No Known Problems Brother  No Known Problems Brother Social History Substance Use Topics  Smoking status: Never Smoker  Smokeless tobacco: Never Used Comment: occ  Alcohol use No  
   Comment: ETOH free for 2 years Patient Active Problem List  
Diagnosis Code  DM [facility]diabetes mellitus) ([facility] E11.9  
 HTN (hypertension) I10  
 Hypercholesteremia E78.00  
 Diabetic neuropathy (HCC) E11.40  Coronary artery disease I25.10  
 H/O heart artery stent Z95.5  Essential hypertension I10  
 Diabetic polyneuropathy associated with type 2 diabetes mellitus ([facility] E11.42 Depression Risk Factor Screening: PHQ over the last two weeks 4/13/2018 Little interest or pleasure in doing things Not at all Feeling down, depressed, irritable, or hopeless Not at all Total Score PHQ 2 0 Alcohol Risk Factor Screening: You do not drink alcohol or very rarely. Functional Ability and Level of Safety:  
Hearing Loss Hearing is good. Activities of Daily Living The home contains: no safety equipment. Patient does total self care Fall Risk Fall Risk Assessment, last 12 mths 4/13/2018 Able to walk? Yes Fall in past 12 months? No  
 
 
Abuse Screen Patient is not abused Cognitive Screening Evaluation of Cognitive Function: 
Has your family/caregiver stated any concerns about your memory: no 
Normal 
 
Patient Care Team  
Patient Care Team: 
Kike Villatoro MD as PCP - General (Internal Medicine) Viktor Coleman MD (Cardiology) Sruthi Young MD (Neurology) Assessment/Plan Education and counseling provided: 
Are appropriate based on today's review and evaluation Diagnoses and all orders for this visit: 1. Essential hypertension 2. Encounter for immunization -     Administration fee () for Medicare insured patients -     Influenza Vaccine Inactivated (IIV)(FLUAD), Subunit, Adjuvanted, IM, ([id number]) 3. Diabetic polyneuropathy associated with type 2 diabetes mellitus ([facility] [address] 4. Hypercholesteremia 5. Coronary artery disease involving native coronary artery of native heart without angina pectoris 6. Type 2 diabetes mellitus with diabetic polyneuropathy, with long-term current use of insulin ([facility] [address] 7. Medicare annual wellness visit, subsequent Health Maintenance Due Topic Date Due  
 COLONOSCOPY  05/19/1968  ZOSTER VACCINE AGE 60>  03/19/2009  
 EYE EXAM RETINAL OR DILATED Q1  10/19/2016  GLAUCOMA SCREENING Q2Y  10/19/2017  Pneumococcal 65+ Low/Medium Risk (2 of 2 - PPSV23) 07/16/2018  Influenza Age 5 to Adult  08/01/2018  MEDICARE YEARLY EXAM  08/02/2018

## 2018-09-12 NOTE — PATIENT INSTRUCTIONS
Body Mass Index: Care Instructions Your Care Instructions Body mass index (BMI) can help you see if your weight is raising your risk for health problems. It uses a formula to compare how much you weigh with how tall you are. · A BMI lower than 18.5 is considered underweight. · A BMI between 18.5 and 24.9 is considered healthy. · A BMI between 25 and 29.9 is considered overweight. A BMI of 30 or higher is considered obese. If your BMI is in the normal range, it means that you have a lower risk for weight-related health problems. If your BMI is in the overweight or obese range, you may be at increased risk for weight-related health problems, such as high blood pressure, heart disease, stroke, arthritis or joint pain, and diabetes. If your BMI is in the underweight range, you may be at increased risk for health problems such as fatigue, lower protection (immunity) against illness, muscle loss, bone loss, hair loss, and hormone problems. BMI is just one measure of your risk for weight-related health problems. You may be at higher risk for health problems if you are not active, you eat an unhealthy diet, or you drink too much alcohol or use tobacco products. Follow-up care is a key part of your treatment and safety. Be sure to make and go to all appointments, and call your doctor if you are having problems. It's also a good idea to know your test results and keep a list of the medicines you take. How can you care for yourself at home? · Practice healthy eating habits. This includes eating plenty of fruits, vegetables, whole grains, lean protein, and low-fat dairy. · If your doctor recommends it, get more exercise. Walking is a good choice. Bit by bit, increase the amount you walk every day. Try for at least 30 minutes on most days of the week. · Do not smoke. Smoking can increase your risk for health problems.  If you need help quitting, talk to your doctor about stop-smoking programs and medicines. These can increase your chances of quitting for good. · Limit alcohol to 2 drinks a day for men and 1 drink a day for women. Too much alcohol can cause health problems. If you have a BMI higher than 25 · Your doctor may do other tests to check your risk for weight-related health problems. This may include measuring the distance around your waist. A waist measurement of more than 40 inches in men or 35 inches in women can increase the risk of weight-related health problems. · Talk with your doctor about steps you can take to stay healthy or improve your health. You may need to make lifestyle changes to lose weight and stay healthy, such as changing your diet and getting regular exercise. If you have a BMI lower than 18.5 · Your doctor may do other tests to check your risk for health problems. · Talk with your doctor about steps you can take to stay healthy or improve your health. You may need to make lifestyle changes to gain or maintain weight and stay healthy, such as getting more healthy foods in your diet and doing exercises to build muscle. Where can you learn more? Go to http://tariq-jean-claude.info/. Enter S176 in the search box to learn more about \"Body Mass Index: Care Instructions. \" Current as of: October 13, 2016 Content Version: 11.4 © 8769-3026 Healthwise, [employer]orporated. Care instructions adapted under license by [employer] [facility] [employer] (which disclaims liability or warranty for this information). If you have questions about a medical condition or this instruction, always ask your healthcare professional. [place] [address] any warranty or liability for your use of this information. Medicare Wellness Visit, Male The best way to live healthy is to have a lifestyle where you eat a well-balanced diet, exercise regularly, limit alcohol use, and quit all forms of tobacco/nicotine, if applicable. Regular preventive services are another way to keep healthy. Preventive services (vaccines, screening tests, monitoring & exams) can help personalize your care plan, which helps you manage your own care. Screening tests can find health problems at the earliest stages, when they are easiest to treat. [address] [facility] follows the current, evidence-based guidelines published by the [facility] Pat[facility] ([facility]) when recommending preventive services for our patients. Because we follow these guidelines, sometimes recommendations change over time as research supports it. (For example, a prostate screening blood test is no longer routinely recommended for men with no symptoms.) Of course, you and your doctor may decide to screen more often for some diseases, based on your risk and co-morbidities (chronic disease you are already diagnosed with). Preventive services for you include: - Medicare offers their members a free annual wellness visit, which is time for you and your primary care provider to discuss and plan for your preventive service needs. Take advantage of this benefit every year! 
-All adults over age 72 should receive the recommended pneumonia vaccines. Current USPSTF guidelines recommend a series of two vaccines for the best pneumonia protection.  
-All adults should have a flu vaccine yearly and an ECG. All adults age 61 and older should receive a shingles vaccine once in their lifetime.   
-All adults age 38-68 who are overweight should have a diabetes screening test once every three years.  
-Other screening tests & preventive services for persons with diabetes include: an eye exam to screen for diabetic retinopathy, a kidney function test, a foot exam, and stricter control over your cholesterol.  
-Cardiovascular screening for adults with routine risk involves an electrocardiogram (ECG) at intervals determined by the provider. -Colorectal cancer screening should be done for adults age 54-65 with no increased risk factors for colorectal cancer. There are a number of acceptable methods of screening for this type of cancer. Each test has its own benefits and drawbacks. Discuss with your provider what is most appropriate for you during your annual wellness visit. The different tests include: colonoscopy (considered the best screening method), a fecal occult blood test, a fecal DNA test, and sigmoidoscopy. 
-All adults born between [facility] should be screened once for Hepatitis C. 
-An Abdominal Aortic Aneurysm (AAA) Screening is recommended for men age 73-68 who has ever smoked in their lifetime. Here is a list of your current Health Maintenance items (your personalized list of preventive services) with a due date: 
Health Maintenance Due Topic Date Due  
 Colonoscopy  [birth year]19/1968  Shingles Vaccine  03/19/2009  Eye Exam  10/19/2016  Glaucoma Screening   10/19/2017  Pneumococcal Vaccine (2 of 2 - PPSV23) 07/16/2018  Flu Vaccine  08/01/2018 [address] [place] [address] Annual Well Visit  08/02/2018

## 2018-09-12 NOTE — PROGRESS NOTES
SUBJECTIVE:  
Mr. Clare Downing is a 70 y.o. male who is here for follow up of routine medical issues. Previously saw Dr. Cristhian Sawant. Chief Complaint Patient presents with  Hypertension  
  pt here today for 4 month f.u Sleeping better. Gets 5 1/2 to 6 hours. Still gets fatigue in the middle of the afternoon. Still seeing Dr. Sunita Vides for what sounds like retinopathy; getting \"that needle in the eye. \"  
Glc at home runs 130s - 140s. He sees Dr. Vitaliy Davila for CAD. Denies CP. He has severe neuralgias in feet, controlled by gabapentin. Dr. Delia Siu did EMG/NCV in Jan 2014. Last colonoscopy more than 10 years ago. At this time, he is otherwise doing well and has brought no other complaints to my attention today. For a list of the medical issues addressed today, see the assessment and plan below. PMH:  
Past Medical History:  
Diagnosis Date  CAD (coronary artery disease)  Diabetes ([address]  Heart disease  Hypertension Past Surgical History:  
Procedure Laterality Date  CARDIAC SURG PROCEDURE UNLIST  HX HEMORRHOIDECTOMY All: He has No Known Allergies. Current Outpatient Prescriptions Medication Sig  
 losartan-hydroCHLOROthiazide (HYZAAR) 100-25 mg per tablet Take 1 Tab by mouth daily for 90 days.  losartan-hydroCHLOROthiazide (HYZAAR) 100-25 mg per tablet Take 1 Tab by mouth daily.  flash glucose scanning reader (FREESTYLE LOCO READER) misc Check Blood Glucose Three Times Daily Dx Code:E11.41, E11.42  
 flash glucose sensor (FREESTYLE LOCO SENSOR) kit Check Blood Glucose Three Times Daily Dx Code:E11.41, E11.42  
 gabapentin (NEURONTIN) 600 mg tablet TAKE 2 TABLETS THREE TIMES A DAY  Insulin Needles, Disposable, (UNIFINE PENTIPS) 31 gauge x 1/4\" ndle Use as directed  metFORMIN (GLUCOPHAGE) 500 mg tablet TAKE 1 TABLET TWICE A DAY WITH MEALS  
 LEVEMIR FLEXTOUCH 100 unit/mL (3 mL) inpn INJECT 45 UNITS UNDER THE SKIN NIGHTLY  traZODone (DESYREL) 50 mg tablet Take 1 Tab by mouth nightly.  pneumococcal 13 noman conj dip (PREVNAR 13, PF,) 0.5 mL syrg injection 0.5 mL by IntraMUSCular route PRIOR TO DISCHARGE for 1 dose.  Diabetic Shoes XX Patient has diabetic foot neuropathy. He needs diabetic shoes to help with his diagnosis. Diabetic neuropathy: Code 250.60  Lancets (MICROLET LANCET) misc Test TID  pravastatin (PRAVACHOL) 40 mg tablet Take 40 mg by mouth nightly. Indications: HYPERCHOLESTEROLEMIA  aspirin 81 mg chewable tablet Take 1 Tab by mouth daily.  clopidogrel (PLAVIX) 75 mg tablet Take 1 Tab by mouth daily.  metoprolol (LOPRESSOR) 25 mg tablet Take 1 Tab by mouth two (2) times a day.  valsartan-hydroCHLOROthiazide (DIOVAN-HCT) 160-25 mg per tablet TAKE 1 TABLET DAILY  simvastatin (ZOCOR) 10 mg tablet Take  by mouth nightly. No current facility-administered medications for this visit. FH: His family history includes Heart Disease in his paternal uncle; and Hypertension in his paternal uncle. Mother  of cancer of some type. Father is living. His brother had prostate cancer. SH: . Retired from [employer], and from [employer], and from [employer]. He reports that he has never smoked. He has never used smokeless tobacco. He reports that he does not drink alcohol or use illicit drugs. ROS: See above; Complete ROS otherwise negative. OBJECTIVE:  
Vitals:  
Visit Vitals  /71 (BP 1 Location: Left arm, BP Patient Position: Sitting)  Pulse (!) 55  Temp 98.6 °F (37 °C) (Oral)  Resp 14  
 Ht 5' 6\" (1.676 m)  Wt 207 lb 11 oz (94.2 kg)  SpO2 97%  BMI 33.52 kg/m2 Gen: Pleasant 70 y.o.  male in NAD. HEENT: PERRLA. EOMI. OP moist and pink. Neck: Supple. No LAD. HEART: RRR, No M/G/R.    LUNGS: CTAB No W/R. ABDOMEN: S, NT, ND, BS+. EXTREMITIES: Warm. No C/C/E.  MUSCULOSKELETAL: Normal ROM, muscle strength 5/5 all groups.   NEURO: Alert and oriented x 3.  Cranial nerves grossly intact. No focal sensory or motor deficits noted. SKIN: Warm. Dry. No rashes or other lesions noted. Lab Results Component Value Date/Time Sodium 142 04/13/2018 10:08 AM  
 Potassium 4.5 04/13/2018 10:08 AM  
 Chloride 101 04/13/2018 10:08 AM  
 CO2 25 04/13/2018 10:08 AM  
 Anion gap 12 07/17/2013 03:25 AM  
 Glucose 110 (H) 04/13/2018 10:08 AM  
 BUN 16 04/13/2018 10:08 AM  
 Creatinine 1.04 04/13/2018 10:08 AM  
 BUN/Creatinine ratio 15 04/13/2018 10:08 AM  
 GFR est AA 85 04/13/2018 10:08 AM  
 GFR est non-AA 73 04/13/2018 10:08 AM  
 Calcium 9.1 04/13/2018 10:08 AM  
 Bilirubin, total 0.5 04/13/2018 10:08 AM  
 ALT (SGPT) 19 04/13/2018 10:08 AM  
 AST (SGOT) 18 04/13/2018 10:08 AM  
 Alk. phosphatase 59 04/13/2018 10:08 AM  
 Protein, total 5.7 (L) 04/13/2018 10:08 AM  
 Albumin 4.0 04/13/2018 10:08 AM  
 Globulin 3.1 07/14/2013 09:50 AM  
 A-G Ratio 2.4 (H) 04/13/2018 10:08 AM  
 
 
Lab Results Component Value Date/Time Cholesterol, total 155 04/13/2018 10:08 AM  
 HDL Cholesterol 39 (L) 04/13/2018 10:08 AM  
 LDL, calculated 96 04/13/2018 10:08 AM  
 Triglyceride 98 04/13/2018 10:08 AM  
 CHOL/HDL Ratio 5.1 (H) 07/15/2013 09:10 AM  
 
  
Lab Results Component Value Date/Time Hemoglobin A1c 10.6 (H) 04/13/2018 10:08 AM  
 
 
Lab Results Component Value Date/Time WBC 5.8 04/13/2018 10:08 AM  
 HGB 15.6 04/13/2018 10:08 AM  
 HCT 45.5 04/13/2018 10:08 AM  
 PLATELET 804 15/06/1788 10:08 AM  
 MCV 91 04/13/2018 10:08 AM  
 
 
 
ASSESSMENT/ PLAN: Carri Reynolds was seen today for follow up. DM (diabetes mellitus): Not controlled at last check. Continue insulin. Seeing Dr. Kamran Clark with Endocrinology.  
- HEMOGLOBIN J8Q 
- METABOLIC PANEL, COMPREHENSIVE 
- LIPID PANEL Insomnia: Improved. trazodone. Anxiety: Bedtime trazodone. Consider other agents down the road. HTN (hypertension): Borderline BP; watch this.   
- METABOLIC PANEL, COMPREHENSIVE 
- LIPID PANEL 
 - CBC WITH AUTOMATED DIFF Hypercholesteremia - LIPID PANEL Coronary artery disease: No CP today. F/U with Cardiology. Diabetic neuropathy: Ongoing, stable. Gapabentin helps. - gabapentin (NEURONTIN) 600 mg tablet; Take 2 tablets by mouth three (3) times daily. Colon cancer screening: Previously have referred to GI. He is disinclined to follow through--\"I heard too many bad stories\"; I reassured him that the procedure saves far more lives than it harms. Follow-up Disposition: 
Return in about 4 months (around 1/12/2019) for HTN. I have reviewed the patient's medications and risks/side effects/benefits were discussed. Diagnosis(-es) explained to patient and questions answered. Literature provided where appropriate. Discussed the patient's BMI with him. The BMI follow up plan is as follows:  
 
dietary management education, guidance, and counseling 
encourage exercise 
monitor weight 
prescribed dietary intake An After Visit Summary was printed and given to the patient.

## 2019-01-29 ENCOUNTER — HOSPITAL ENCOUNTER (OUTPATIENT)
Dept: LAB | Age: 69
Discharge: HOME | End: 2019-01-29
Payer: MEDICARE

## 2019-01-29 ENCOUNTER — OFFICE VISIT (OUTPATIENT)
Dept: INTERNAL MEDICINE CLINIC | Age: 69
End: 2019-01-29

## 2019-01-29 VITALS
WEIGHT: 211 LBS | SYSTOLIC BLOOD PRESSURE: 120 MMHG | HEART RATE: 64 BPM | HEIGHT: 66 IN | RESPIRATION RATE: 18 BRPM | BODY MASS INDEX: 33.91 KG/M2 | DIASTOLIC BLOOD PRESSURE: 71 MMHG | TEMPERATURE: 98.5 F | OXYGEN SATURATION: 96 %

## 2019-01-29 DIAGNOSIS — E11.42 TYPE 2 DIABETES MELLITUS WITH DIABETIC POLYNEUROPATHY, WITH LONG-TERM CURRENT USE OF INSULIN (HCC): ICD-10-CM

## 2019-01-29 DIAGNOSIS — E78.00 HYPERCHOLESTEREMIA: ICD-10-CM

## 2019-01-29 DIAGNOSIS — Z79.4 TYPE 2 DIABETES MELLITUS WITH DIABETIC POLYNEUROPATHY, WITH LONG-TERM CURRENT USE OF INSULIN (HCC): ICD-10-CM

## 2019-01-29 DIAGNOSIS — I10 ESSENTIAL HYPERTENSION: ICD-10-CM

## 2019-01-29 DIAGNOSIS — I25.10 CORONARY ARTERY DISEASE INVOLVING NATIVE CORONARY ARTERY OF NATIVE HEART WITHOUT ANGINA PECTORIS: ICD-10-CM

## 2019-01-29 DIAGNOSIS — H35.00 RETINOPATHY: Primary | ICD-10-CM

## 2019-01-29 PROCEDURE — 80053 COMPREHEN METABOLIC PANEL: CPT

## 2019-01-29 PROCEDURE — 80061 LIPID PANEL: CPT

## 2019-01-29 PROCEDURE — 85025 COMPLETE CBC W/AUTO DIFF WBC: CPT

## 2019-01-29 PROCEDURE — 83036 HEMOGLOBIN GLYCOSYLATED A1C: CPT

## 2019-01-29 PROCEDURE — 36415 COLL VENOUS BLD VENIPUNCTURE: CPT

## 2019-01-29 PROCEDURE — 82043 UR ALBUMIN QUANTITATIVE: CPT

## 2019-01-29 NOTE — PROGRESS NOTES
1. Have you been to the ER, urgent care clinic since your last visit? Hospitalized since your last visit?no 2. Have you seen or consulted any other health care providers outside of the [address] [place] [address] since your last visit? Include any pap smears or colon screening.  no

## 2019-01-29 NOTE — PROGRESS NOTES
SUBJECTIVE:  
Mr. Catie Huntley. is a 70 y.o. male who is here for follow up of routine medical issues. Previously saw Dr. Estefania Olmedo. Chief Complaint Patient presents with  Diabetes  
  pt here today for 4 month f.u Sleeping \"the same. \" Gets 5 1/2 to 6 hours. Still gets fatigue in the middle of the afternoon. Has been seeing Dr. Salo Babcock for what sounds like retinopathy; getting \"that needle in the eye. \"  However, lost to follow up in past year. Glc at home runs 130s - 140s. He sees Dr. Morgan Russell for CAD. Denies CP. He has severe neuralgias in feet, controlled by gabapentin. Dr. Augustin Arboleda did EMG/NCV in Jan 2014. Last colonoscopy more than 10 years ago. At this time, he is otherwise doing well and has brought no other complaints to my attention today. For a list of the medical issues addressed today, see the assessment and plan below. PMH:  
Past Medical History:  
Diagnosis Date  CAD (coronary artery disease)  Diabetes ([facility]  Heart disease  Hypertension Past Surgical History:  
Procedure Laterality Date  CARDIAC SURG PROCEDURE UNLIST  HX HEMORRHOIDECTOMY All: He has No Known Allergies. Current Outpatient Medications Medication Sig  
 losartan-hydroCHLOROthiazide (HYZAAR) 100-25 mg per tablet Take 1 Tab by mouth daily.  flash glucose scanning reader (FREESTYLE LOCO READER) misc Check Blood Glucose Three Times Daily Dx Code:E11.41, E11.42  
 flash glucose sensor (FREESTYLE LOCO SENSOR) kit Check Blood Glucose Three Times Daily Dx Code:E11.41, E11.42  
 gabapentin (NEURONTIN) 600 mg tablet TAKE 2 TABLETS THREE TIMES A DAY  Insulin Needles, Disposable, (UNIFINE PENTIPS) 31 gauge x 1/4\" ndle Use as directed  metFORMIN (GLUCOPHAGE) 500 mg tablet TAKE 1 TABLET TWICE A DAY WITH MEALS  
 LEVEMIR FLEXTOUCH 100 unit/mL (3 mL) inpn INJECT 45 UNITS UNDER THE SKIN NIGHTLY  traZODone (DESYREL) 50 mg tablet Take 1 Tab by mouth nightly.  simvastatin (ZOCOR) 10 mg tablet Take  by mouth nightly.  Diabetic Shoes XX Patient has diabetic foot neuropathy. He needs diabetic shoes to help with his diagnosis. Diabetic neuropathy: Code 250.60  Lancets (MICROLET LANCET) misc Test TID  aspirin 81 mg chewable tablet Take 1 Tab by mouth daily.  clopidogrel (PLAVIX) 75 mg tablet Take 1 Tab by mouth daily.  metoprolol (LOPRESSOR) 25 mg tablet Take 1 Tab by mouth two (2) times a day.  valsartan-hydroCHLOROthiazide (DIOVAN-HCT) 160-25 mg per tablet TAKE 1 TABLET DAILY  pneumococcal 13 noman conj dip (PREVNAR 13, PF,) 0.5 mL syrg injection 0.5 mL by IntraMUSCular route PRIOR TO DISCHARGE for 1 dose.  pravastatin (PRAVACHOL) 40 mg tablet Take 40 mg by mouth nightly. Indications: HYPERCHOLESTEROLEMIA No current facility-administered medications for this visit. FH: His family history includes Heart Disease in his paternal uncle; and Hypertension in his paternal uncle. Mother  of cancer of some type. Father is living. His brother had prostate cancer. SH: . Retired from [employer], and from [employer], and from [employer]. He reports that  has never smoked. he has never used smokeless tobacco. He reports that he does not drink alcohol or use drugs. ROS: See above; Complete ROS otherwise negative. OBJECTIVE:  
Vitals:  
Visit Vitals /71 (BP 1 Location: Left arm, BP Patient Position: Sitting) Pulse 64 Temp 98.5 °F (36.9 °C) (Oral) Resp 18 Ht 5' 6\" (1.676 m) Wt 211 lb (95.7 kg) SpO2 96% BMI 34.06 kg/m² Gen: Pleasant 70 y.o.  male in NAD. HEENT: PERRLA. EOMI. OP moist and pink. Neck: Supple. No LAD. HEART: RRR, No M/G/R.    LUNGS: CTAB No W/R. ABDOMEN: S, NT, ND, BS+. EXTREMITIES: Warm. No C/C/E.  MUSCULOSKELETAL: Normal ROM, muscle strength 5/5 all groups. NEURO: Alert and oriented x 3. Cranial nerves grossly intact.   No focal sensory or motor deficits noted. SKIN: Warm. Dry. No rashes or other lesions noted. Lab Results Component Value Date/Time Sodium 142 04/13/2018 10:08 AM  
 Potassium 4.5 04/13/2018 10:08 AM  
 Chloride 101 04/13/2018 10:08 AM  
 CO2 25 04/13/2018 10:08 AM  
 Anion gap 12 07/17/2013 03:25 AM  
 Glucose 110 (H) 04/13/2018 10:08 AM  
 BUN 16 04/13/2018 10:08 AM  
 Creatinine 1.04 04/13/2018 10:08 AM  
 BUN/Creatinine ratio 15 04/13/2018 10:08 AM  
 GFR est AA 85 04/13/2018 10:08 AM  
 GFR est non-AA 73 04/13/2018 10:08 AM  
 Calcium 9.1 04/13/2018 10:08 AM  
 Bilirubin, total 0.5 04/13/2018 10:08 AM  
 ALT (SGPT) 19 04/13/2018 10:08 AM  
 AST (SGOT) 18 04/13/2018 10:08 AM  
 Alk. phosphatase 59 04/13/2018 10:08 AM  
 Protein, total 5.7 (L) 04/13/2018 10:08 AM  
 Albumin 4.0 04/13/2018 10:08 AM  
 Globulin 3.1 07/14/2013 09:50 AM  
 A-G Ratio 2.4 (H) 04/13/2018 10:08 AM  
 
 
Lab Results Component Value Date/Time Cholesterol, total 155 04/13/2018 10:08 AM  
 HDL Cholesterol 39 (L) 04/13/2018 10:08 AM  
 LDL, calculated 96 04/13/2018 10:08 AM  
 Triglyceride 98 04/13/2018 10:08 AM  
 CHOL/HDL Ratio 5.1 (H) 07/15/2013 09:10 AM  
 
  
Lab Results Component Value Date/Time Hemoglobin A1c 10.6 (H) 04/13/2018 10:08 AM  
 
 
Lab Results Component Value Date/Time WBC 5.8 04/13/2018 10:08 AM  
 HGB 15.6 04/13/2018 10:08 AM  
 HCT 45.5 04/13/2018 10:08 AM  
 PLATELET 944 78/54/5325 10:08 AM  
 MCV 91 04/13/2018 10:08 AM  
 
 
 
ASSESSMENT/ PLAN: Shubham Morse was seen today for follow up. DM (diabetes mellitus): Not controlled at last check. Continue insulin. Seeing Dr. Burgess  with Endocrinology. Insomnia: Improved. trazodone. Anxiety: Bedtime trazodone. Consider other agents down the road. HTN (hypertension): Excellent BP today. Hypercholesteremia - LIPID PANEL Coronary artery disease: No CP today. F/U with Cardiology. Diabetic neuropathy: Ongoing, stable. Gapabentin helps. - gabapentin (NEURONTIN) 600 mg tablet; Take 2 tablets by mouth three (3) times daily. Colon cancer screening: Previously have referred to GI. He is disinclined to follow through--\"I heard too many bad stories\"; I reassured him that the procedure saves far more lives than it harms. Follow-up Disposition: 
Return in about 4 months (around 5/29/2019) for DM, HTN, etc. 
 
I have reviewed the patient's medications and risks/side effects/benefits were discussed. Diagnosis(-es) explained to patient and questions answered. Literature provided where appropriate.

## 2019-01-30 LAB
ALBUMIN SERPL-MCNC: 4.1 G/DL (ref 3.6–4.8)
ALBUMIN/GLOB SERPL: 2.1 {RATIO} (ref 1.2–2.2)
ALP SERPL-CCNC: 76 IU/L (ref 39–117)
ALT SERPL-CCNC: 27 IU/L (ref 0–44)
AST SERPL-CCNC: 25 IU/L (ref 0–40)
BASOPHILS # BLD AUTO: 0 X10E3/UL (ref 0–0.2)
BASOPHILS NFR BLD AUTO: 1 %
BILIRUB SERPL-MCNC: 0.4 MG/DL (ref 0–1.2)
BUN SERPL-MCNC: 20 MG/DL (ref 8–27)
BUN/CREAT SERPL: 19 (ref 10–24)
CALCIUM SERPL-MCNC: 8.8 MG/DL (ref 8.6–10.2)
CHLORIDE SERPL-SCNC: 102 MMOL/L (ref 96–106)
CHOLEST SERPL-MCNC: 156 MG/DL (ref 100–199)
CO2 SERPL-SCNC: 23 MMOL/L (ref 20–29)
CREAT SERPL-MCNC: 1.07 MG/DL (ref 0.76–1.27)
EOSINOPHIL # BLD AUTO: 0.1 X10E3/UL (ref 0–0.4)
EOSINOPHIL NFR BLD AUTO: 1 %
ERYTHROCYTE [DISTWIDTH] IN BLOOD BY AUTOMATED COUNT: 13.4 % (ref 12.3–15.4)
EST. AVERAGE GLUCOSE BLD GHB EST-MCNC: 283 MG/DL
GLOBULIN SER CALC-MCNC: 2 G/DL (ref 1.5–4.5)
GLUCOSE SERPL-MCNC: 296 MG/DL (ref 65–99)
HBA1C MFR BLD: 11.5 % (ref 4.8–5.6)
HCT VFR BLD AUTO: 47.1 % (ref 37.5–51)
HDLC SERPL-MCNC: 42 MG/DL
HGB BLD-MCNC: 15.3 G/DL (ref 13–17.7)
IMM GRANULOCYTES # BLD AUTO: 0 X10E3/UL (ref 0–0.1)
IMM GRANULOCYTES NFR BLD AUTO: 1 %
LDLC SERPL CALC-MCNC: 97 MG/DL (ref 0–99)
LYMPHOCYTES # BLD AUTO: 2.1 X10E3/UL (ref 0.7–3.1)
LYMPHOCYTES NFR BLD AUTO: 32 %
MCH RBC QN AUTO: 30.7 PG (ref 26.6–33)
MCHC RBC AUTO-ENTMCNC: 32.5 G/DL (ref 31.5–35.7)
MCV RBC AUTO: 95 FL (ref 79–97)
MICROALBUMIN UR-MCNC: 19 UG/ML
MONOCYTES # BLD AUTO: 0.6 X10E3/UL (ref 0.1–0.9)
MONOCYTES NFR BLD AUTO: 9 %
NEUTROPHILS # BLD AUTO: 3.7 X10E3/UL (ref 1.4–7)
NEUTROPHILS NFR BLD AUTO: 56 %
PLATELET # BLD AUTO: 198 X10E3/UL (ref 150–379)
POTASSIUM SERPL-SCNC: 4.8 MMOL/L (ref 3.5–5.2)
PROT SERPL-MCNC: 6.1 G/DL (ref 6–8.5)
RBC # BLD AUTO: 4.98 X10E6/UL (ref 4.14–5.8)
SODIUM SERPL-SCNC: 141 MMOL/L (ref 134–144)
TRIGL SERPL-MCNC: 85 MG/DL (ref 0–149)
VLDLC SERPL CALC-MCNC: 17 MG/DL (ref 5–40)
WBC # BLD AUTO: 6.5 X10E3/UL (ref 3.4–10.8)

## 2019-02-07 RX ORDER — LOSARTAN POTASSIUM AND HYDROCHLOROTHIAZIDE 25; 100 MG/1; MG/1
TABLET ORAL
Qty: 90 TAB | Refills: 1 | Status: SHIPPED | OUTPATIENT
Start: 2019-02-07 | End: 2019-08-06 | Stop reason: SDUPTHER

## 2019-02-07 RX ORDER — METFORMIN HYDROCHLORIDE 500 MG/1
TABLET ORAL
Qty: 180 TAB | Refills: 2 | Status: SHIPPED | OUTPATIENT
Start: 2019-02-07 | End: 2019-11-04 | Stop reason: SDUPTHER

## 2019-04-01 NOTE — PROGRESS NOTES
C/o right shoulder pain after he tripped and fell last night at home. No loc. Skin is warm and pink.      hospitals  04/01/19 3031 Diabetes is very poorly controlled; otherwise labs okay. He needs to make appointment with Dr. Alma CHUN. Consider going up 5 units on insulin dose.   [facility]

## 2019-04-02 RX ORDER — GABAPENTIN 600 MG/1
TABLET ORAL
Qty: 540 TAB | Refills: 2 | Status: SHIPPED | OUTPATIENT
Start: 2019-04-02 | End: 2019-10-04 | Stop reason: SDUPTHER

## 2019-04-24 NOTE — TELEPHONE ENCOUNTER
Patient wife, Camilo Stubbs states patient needs refill done thru [facility].  Thank you

## 2019-05-16 RX ORDER — PEN NEEDLE, DIABETIC 29 G X1/2"
NEEDLE, DISPOSABLE MISCELLANEOUS
Qty: 100 PEN NEEDLE | Refills: 3 | Status: SHIPPED | OUTPATIENT
Start: 2019-05-16 | End: 2020-06-29

## 2019-08-07 RX ORDER — LOSARTAN POTASSIUM AND HYDROCHLOROTHIAZIDE 25; 100 MG/1; MG/1
TABLET ORAL
Qty: 90 TAB | Refills: 1 | Status: SHIPPED | OUTPATIENT
Start: 2019-08-07 | End: 2020-03-02

## 2019-10-04 ENCOUNTER — TELEPHONE (OUTPATIENT)
Dept: INTERNAL MEDICINE CLINIC | Age: 69
End: 2019-10-04

## 2019-10-04 DIAGNOSIS — E11.41 DIABETIC MONONEUROPATHY ASSOCIATED WITH TYPE 2 DIABETES MELLITUS (HCC): Primary | ICD-10-CM

## 2019-10-04 NOTE — TELEPHONE ENCOUNTER
Pt is requesting an appointment to see the doctor on 10/17 because that is the only day he'll have transportation. He would like it around 11:30 am if possible.  Best contact number is [phone]     Message received & copied from [facility]

## 2019-10-04 NOTE — TELEPHONE ENCOUNTER
Pt received a call from [facility] advising him that his prescription for Gabapentin has no more refills and to contact the doctor.  Best contact number is [phone]     Message received & copied from [facility]

## 2019-10-04 NOTE — TELEPHONE ENCOUNTER
Thursday, October 17, 2019 11:30 AM  Message was left for patient with appointment details and to contact office if changes need to be made.

## 2019-10-07 RX ORDER — GABAPENTIN 600 MG/1
TABLET ORAL
Qty: 540 TAB | Refills: 2 | Status: SHIPPED | OUTPATIENT
Start: 2019-10-07 | End: 2020-04-15 | Stop reason: SDUPTHER

## 2019-10-07 NOTE — TELEPHONE ENCOUNTER
PCP: Alice Fonseca MD    Last appt: 1/29/2019  Future Appointments   Date Time Provider Ermelinda Bolton   10/17/2019 11:30 AM Alice Fonseca MD [facility]       Requested Prescriptions     Pending Prescriptions Disp Refills    gabapentin (NEURONTIN) 600 mg tablet 540 Tab 2     Sig: TAKE 2 TABLETS THREE TIMES A DAY

## 2019-10-07 NOTE — TELEPHONE ENCOUNTER
Script for gabapentin has been faxed to [phone] at fax # [phone], approved fax confirmation received.

## 2019-10-17 ENCOUNTER — OFFICE VISIT (OUTPATIENT)
Dept: INTERNAL MEDICINE CLINIC | Age: 69
End: 2019-10-17

## 2019-10-17 VITALS
RESPIRATION RATE: 14 BRPM | BODY MASS INDEX: 33.27 KG/M2 | DIASTOLIC BLOOD PRESSURE: 68 MMHG | TEMPERATURE: 98.5 F | SYSTOLIC BLOOD PRESSURE: 125 MMHG | HEART RATE: 66 BPM | WEIGHT: 207 LBS | HEIGHT: 66 IN | OXYGEN SATURATION: 96 %

## 2019-10-17 DIAGNOSIS — E11.42 TYPE 2 DIABETES MELLITUS WITH DIABETIC POLYNEUROPATHY, WITH LONG-TERM CURRENT USE OF INSULIN (HCC): Primary | ICD-10-CM

## 2019-10-17 DIAGNOSIS — Z79.4 TYPE 2 DIABETES MELLITUS WITH DIABETIC POLYNEUROPATHY, WITH LONG-TERM CURRENT USE OF INSULIN (HCC): Primary | ICD-10-CM

## 2019-10-17 DIAGNOSIS — I10 ESSENTIAL HYPERTENSION: ICD-10-CM

## 2019-10-17 DIAGNOSIS — E78.00 HYPERCHOLESTEREMIA: ICD-10-CM

## 2019-10-17 DIAGNOSIS — Z23 ENCOUNTER FOR IMMUNIZATION: ICD-10-CM

## 2019-10-17 NOTE — PATIENT INSTRUCTIONS
Office Policies Phone calls/patient messages: Please allow up to 24 hours for someone in the office to contact you about your call or message. Be mindful your provider may be out of the office or your message may require further review. We encourage you to use [employer] for your messages as this is a faster, more efficient way to communicate with our office Medication Refills: 
         
Prescription medications require 48-72 business hours to process. We encourage you to use [employer] for your refills. For controlled medications: Please allow 72 business hours to process. Certain medications may require you to  a written prescription at our office. NO narcotic/controlled medications will be prescribed after 4pm Monday through Friday or on weekends Form/Paperwork Completion: 
         
Please note a $25 fee may incur for all paperwork for completed by our providers. We ask that you allow 7-10 business days. Pre-payment is due prior to picking up/faxing the completed form. You may also download your forms to [employer] to have your doctor print off.

## 2019-10-17 NOTE — PROGRESS NOTES
SUBJECTIVE:   Mr. Koko Henry is a 78 y.o. male who is here for follow up of routine medical issues. Previously saw Dr. Soto Smith. Chief Complaint   Patient presents with    Diabetes     pt here today for a routine f.u     Sleeping \"the same. \" Gets 5 1/2 to 6 hours. Still gets fatigue in the middle of the afternoon. Just had eye injection this morning. Has been seeing Dr. Ada Núñez for what sounds like retinopathy; getting \"that needle in the eye. \"  However, lost to follow up in past year. Glc at home runs 130s - 140s. He sees Dr. Violeta Sawant for CAD. Denies CP. He has severe neuralgias in feet, controlled by gabapentin. Dr. Swetha Mena did EMG/NCV in Jan 2014. Last colonoscopy more than 10 years ago. At this time, he is otherwise doing well and has brought no other complaints to my attention today. For a list of the medical issues addressed today, see the assessment and plan below. PMH:   Past Medical History:   Diagnosis Date    CAD (coronary artery disease)     Diabetes ([address]     Heart disease     Hypertension        Past Surgical History:   Procedure Laterality Date    CARDIAC SURG PROCEDURE UNLIST      HX HEMORRHOIDECTOMY         All: He has No Known Allergies.    Current Outpatient Medications   Medication Sig    gabapentin (NEURONTIN) 600 mg tablet TAKE 2 TABLETS THREE TIMES A DAY    losartan-hydroCHLOROthiazide (HYZAAR) 100-25 mg per tablet TAKE 1 TABLET DAILY    Insulin Needles, Disposable, (UNIFINE PENTIPS) 31 gauge x 1/4\" ndle USE AS DIRECTED    insulin detemir U-100 (LEVEMIR FLEXTOUCH U-100 INSULN) 100 unit/mL (3 mL) inpn INJECT 45 UNITS UNDER THE SKIN NIGHTLY    metFORMIN (GLUCOPHAGE) 500 mg tablet TAKE 1 TABLET TWICE A DAY WITH MEALS    flash glucose scanning reader (FREESTYLE LOCO READER) misc Check Blood Glucose Three Times Daily Dx Code:E11.41, E11.42    flash glucose sensor (FREESTYLE LOCO SENSOR) kit Check Blood Glucose Three Times Daily Dx Code:E11.41, E11.42    traZODone (DESYREL) 50 mg tablet Take 1 Tab by mouth nightly.  simvastatin (ZOCOR) 10 mg tablet Take  by mouth nightly.  Diabetic Shoes XX Patient has diabetic foot neuropathy. He needs diabetic shoes to help with his diagnosis. Diabetic neuropathy: Code 250.60    Lancets (MICROLET LANCET) misc Test TID    aspirin 81 mg chewable tablet Take 1 Tab by mouth daily.  clopidogrel (PLAVIX) 75 mg tablet Take 1 Tab by mouth daily.  metoprolol (LOPRESSOR) 25 mg tablet Take 1 Tab by mouth two (2) times a day.  valsartan-hydroCHLOROthiazide (DIOVAN-HCT) 160-25 mg per tablet TAKE 1 TABLET DAILY    pneumococcal 13 noman conj dip (PREVNAR 13, PF,) 0.5 mL syrg injection 0.5 mL by IntraMUSCular route PRIOR TO DISCHARGE for 1 dose.  pravastatin (PRAVACHOL) 40 mg tablet Take 40 mg by mouth nightly. Indications: HYPERCHOLESTEROLEMIA     No current facility-administered medications for this visit. FH: His family history includes Heart Disease in his paternal uncle; and Hypertension in his paternal uncle. Mother  of cancer of some type. Father is living. His brother had prostate cancer. SH: . Retired from [employer], and from [employer], and from [employer] He reports that he has never smoked. He has never used smokeless tobacco. He reports that he does not drink alcohol or use drugs. ROS: See above; Complete ROS otherwise negative. OBJECTIVE:   Vitals:   Visit Vitals  /68 (BP 1 Location: Left arm, BP Patient Position: Sitting)   Pulse 66   Temp 98.5 °F (36.9 °C) (Oral)   Resp 14   Ht 5' 6\" (1.676 m)   Wt 207 lb (93.9 kg)   SpO2 96%   BMI 33.41 kg/m²      Gen: Pleasant 78 y.o.  male in NAD. HEENT: PERRLA. EOMI. OP moist and pink. Neck: Supple. No LAD. HEART: RRR, No M/G/R.    LUNGS: CTAB No W/R. ABDOMEN: S, NT, ND, BS+. EXTREMITIES: Warm. No C/C/E.  MUSCULOSKELETAL: Normal ROM, muscle strength 5/5 all groups. NEURO: Alert and oriented x 3. Cranial nerves grossly intact. No focal sensory or motor deficits noted. SKIN: Warm. Dry. No rashes or other lesions noted. Lab Results   Component Value Date/Time    Sodium 141 01/29/2019 04:11 PM    Potassium 4.8 01/29/2019 04:11 PM    Chloride 102 01/29/2019 04:11 PM    CO2 23 01/29/2019 04:11 PM    Anion gap 12 07/17/2013 03:25 AM    Glucose 296 (H) 01/29/2019 04:11 PM    BUN 20 01/29/2019 04:11 PM    Creatinine 1.07 01/29/2019 04:11 PM    BUN/Creatinine ratio 19 01/29/2019 04:11 PM    GFR est AA 81 01/29/2019 04:11 PM    GFR est non-AA 70 01/29/2019 04:11 PM    Calcium 8.8 01/29/2019 04:11 PM    Bilirubin, total 0.4 01/29/2019 04:11 PM    ALT (SGPT) 27 01/29/2019 04:11 PM    AST (SGOT) 25 01/29/2019 04:11 PM    Alk. phosphatase 76 01/29/2019 04:11 PM    Protein, total 6.1 01/29/2019 04:11 PM    Albumin 4.1 01/29/2019 04:11 PM    Globulin 3.1 07/14/2013 09:50 AM    A-G Ratio 2.1 01/29/2019 04:11 PM       Lab Results   Component Value Date/Time    Cholesterol, total 156 01/29/2019 04:11 PM    HDL Cholesterol 42 01/29/2019 04:11 PM    LDL, calculated 97 01/29/2019 04:11 PM    Triglyceride 85 01/29/2019 04:11 PM    CHOL/HDL Ratio 5.1 (H) 07/15/2013 09:10 AM        Lab Results   Component Value Date/Time    Hemoglobin A1c 11.5 (H) 01/29/2019 04:11 PM       Lab Results   Component Value Date/Time    WBC 6.5 01/29/2019 04:11 PM    HGB 15.3 01/29/2019 04:11 PM    HCT 47.1 01/29/2019 04:11 PM    PLATELET 069 07/23/4502 04:11 PM    MCV 95 01/29/2019 04:11 PM         ASSESSMENT/ PLAN: Emi Montemayor was seen today for follow up. DM (diabetes mellitus): Not controlled at last check. Continue insulin. Seeing Dr. Gregg De La Torre with Endocrinology. Insomnia: Improved. trazodone. Anxiety: Bedtime trazodone. Consider other agents down the road. HTN (hypertension): Excellent BP today. Hypercholesteremia  - LIPID PANEL    Coronary artery disease: No CP today. F/U with Cardiology. Diabetic neuropathy: Ongoing, stable. Gapabentin helps. - gabapentin (NEURONTIN) 600 mg tablet; Take 2 tablets by mouth three (3) times daily. Diabetic retinopathy: Seeing ophthalmologist.     Colon cancer screening: Previously have referred to GI. He is disinclined to follow through      Follow-up and Dispositions    · Return in about 4 months (around 2/17/2020) for HTN. I have reviewed the patient's medications and risks/side effects/benefits were discussed. Diagnosis(-es) explained to patient and questions answered. Literature provided where appropriate.

## 2019-10-17 NOTE — PROGRESS NOTES
1. Have you been to the ER, urgent care clinic since your last visit? Hospitalized since your last visit?no    2. Have you seen or consulted any other health care providers outside of the [address] [place] [address] since your last visit? Include any pap smears or colon screening.  no

## 2019-11-04 RX ORDER — METFORMIN HYDROCHLORIDE 500 MG/1
TABLET ORAL
Qty: 180 TAB | Refills: 4 | Status: SHIPPED | OUTPATIENT
Start: 2019-11-04 | End: 2021-01-27 | Stop reason: SDUPTHER

## 2020-02-17 ENCOUNTER — OFFICE VISIT (OUTPATIENT)
Dept: INTERNAL MEDICINE CLINIC | Age: 70
End: 2020-02-17

## 2020-02-17 VITALS
BODY MASS INDEX: 33.27 KG/M2 | DIASTOLIC BLOOD PRESSURE: 79 MMHG | OXYGEN SATURATION: 97 % | WEIGHT: 207 LBS | RESPIRATION RATE: 20 BRPM | HEIGHT: 66 IN | SYSTOLIC BLOOD PRESSURE: 124 MMHG | HEART RATE: 61 BPM | TEMPERATURE: 98.5 F

## 2020-02-17 DIAGNOSIS — I10 ESSENTIAL HYPERTENSION: Primary | ICD-10-CM

## 2020-02-17 RX ORDER — METOPROLOL TARTRATE 25 MG/1
25 TABLET, FILM COATED ORAL 2 TIMES DAILY
Qty: 180 TAB | Refills: 2 | Status: SHIPPED | OUTPATIENT
Start: 2020-02-17 | End: 2020-10-29

## 2020-02-17 NOTE — PATIENT INSTRUCTIONS
Office Policies    Phone calls/patient messages:            Please allow up to 24 hours for someone in the office to contact you about your call or message. Be mindful your provider may be out of the office or your message may require further review. We encourage you to use [employer] for your messages as this is a faster, more efficient way to communicate with our office                         Medication Refills:            Prescription medications require 48-72 business hours to process. We encourage you to use [employer] for your refills. For controlled medications: Please allow 72 business hours to process. Certain medications may require you to  a written prescription at our office. NO narcotic/controlled medications will be prescribed after 4pm Monday through Friday or on weekends              Form/Paperwork Completion:            Please note a $25 fee may incur for all paperwork for completed by our providers. We ask that you allow 7-10 business days. Pre-payment is due prior to picking up/faxing the completed form. You may also download your forms to [employer] to have your doctor print off.      1. Have you been to the ER, urgent care clinic since your last visit? Hospitalized since your last visit?no    2. Have you seen or consulted any other health care providers outside of the [address] [place] [address] since your last visit? Include any pap smears or colon screening.  no

## 2020-02-25 ENCOUNTER — OFFICE VISIT (OUTPATIENT)
Dept: INTERNAL MEDICINE CLINIC | Age: 70
End: 2020-02-25

## 2020-02-25 ENCOUNTER — HOSPITAL ENCOUNTER (OUTPATIENT)
Dept: LAB | Age: 70
Discharge: HOME | End: 2020-02-25
Payer: MEDICARE

## 2020-02-25 VITALS
DIASTOLIC BLOOD PRESSURE: 74 MMHG | BODY MASS INDEX: 33.14 KG/M2 | HEIGHT: 66 IN | RESPIRATION RATE: 18 BRPM | TEMPERATURE: 98.5 F | SYSTOLIC BLOOD PRESSURE: 116 MMHG | WEIGHT: 206.2 LBS | HEART RATE: 54 BPM | OXYGEN SATURATION: 96 %

## 2020-02-25 DIAGNOSIS — E78.00 HYPERCHOLESTEREMIA: ICD-10-CM

## 2020-02-25 DIAGNOSIS — I25.10 CORONARY ARTERY DISEASE INVOLVING NATIVE CORONARY ARTERY OF NATIVE HEART WITHOUT ANGINA PECTORIS: ICD-10-CM

## 2020-02-25 DIAGNOSIS — Z12.11 COLON CANCER SCREENING: ICD-10-CM

## 2020-02-25 DIAGNOSIS — Z79.4 TYPE 2 DIABETES MELLITUS WITH DIABETIC POLYNEUROPATHY, WITH LONG-TERM CURRENT USE OF INSULIN (HCC): ICD-10-CM

## 2020-02-25 DIAGNOSIS — Z00.00 MEDICARE ANNUAL WELLNESS VISIT, SUBSEQUENT: Primary | ICD-10-CM

## 2020-02-25 DIAGNOSIS — R13.10 DYSPHAGIA, UNSPECIFIED TYPE: ICD-10-CM

## 2020-02-25 DIAGNOSIS — E11.41 DIABETIC MONONEUROPATHY ASSOCIATED WITH TYPE 2 DIABETES MELLITUS (HCC): ICD-10-CM

## 2020-02-25 DIAGNOSIS — I10 ESSENTIAL HYPERTENSION: ICD-10-CM

## 2020-02-25 DIAGNOSIS — E11.42 TYPE 2 DIABETES MELLITUS WITH DIABETIC POLYNEUROPATHY, WITH LONG-TERM CURRENT USE OF INSULIN (HCC): ICD-10-CM

## 2020-02-25 DIAGNOSIS — E11.42 DIABETIC POLYNEUROPATHY ASSOCIATED WITH TYPE 2 DIABETES MELLITUS (HCC): ICD-10-CM

## 2020-02-25 PROCEDURE — 80053 COMPREHEN METABOLIC PANEL: CPT

## 2020-02-25 PROCEDURE — 82043 UR ALBUMIN QUANTITATIVE: CPT

## 2020-02-25 PROCEDURE — 83036 HEMOGLOBIN GLYCOSYLATED A1C: CPT

## 2020-02-25 PROCEDURE — 36415 COLL VENOUS BLD VENIPUNCTURE: CPT

## 2020-02-25 PROCEDURE — 85025 COMPLETE CBC W/AUTO DIFF WBC: CPT

## 2020-02-25 PROCEDURE — 80061 LIPID PANEL: CPT

## 2020-02-25 NOTE — PROGRESS NOTES
This is the Subsequent Medicare Annual Wellness Exam, performed 12 months or more after the Initial AWV or the last Subsequent AWV    I have reviewed the patient's medical history in detail and updated the computerized patient record. History     Patient Active Problem List   Diagnosis Code    DM (diabetes mellitus) ([facility] E11.9    HTN (hypertension) I10    Hypercholesteremia E78.00    Diabetic neuropathy ([facility] E11.40    Coronary artery disease I25.10    H/O heart artery stent Z95.5    Essential hypertension I10    Diabetic polyneuropathy associated with type 2 diabetes mellitus ([facility] E11.42     Past Medical History:   Diagnosis Date    CAD (coronary artery disease)     Diabetes ([facility]     Heart disease     Hypertension       Past Surgical History:   Procedure Laterality Date    CARDIAC SURG PROCEDURE UNLIST      HX HEMORRHOIDECTOMY       Current Outpatient Medications   Medication Sig Dispense Refill    metoprolol tartrate (LOPRESSOR) 25 mg tablet Take 1 Tab by mouth two (2) times a day. 180 Tab 2    metFORMIN (GLUCOPHAGE) 500 mg tablet TAKE 1 TABLET TWICE A DAY WITH MEALS 180 Tab 4    gabapentin (NEURONTIN) 600 mg tablet TAKE 2 TABLETS THREE TIMES A  Tab 2    losartan-hydroCHLOROthiazide (HYZAAR) 100-25 mg per tablet TAKE 1 TABLET DAILY 90 Tab 1    Insulin Needles, Disposable, (UNIFINE PENTIPS) 31 gauge x 1/4\" ndle USE AS DIRECTED 100 Pen Needle 3    insulin detemir U-100 (LEVEMIR FLEXTOUCH U-100 INSULN) 100 unit/mL (3 mL) inpn INJECT 45 UNITS UNDER THE SKIN NIGHTLY 1500 mL 3    flash glucose scanning reader (FREESTYLE LOCO READER) misc Check Blood Glucose Three Times Daily Dx Code:E11.41, E11.42 1 Device 0    flash glucose sensor (FREESTYLE LOCO SENSOR) kit Check Blood Glucose Three Times Daily Dx Code:E11.41, E11.42 3 Box 3    traZODone (DESYREL) 50 mg tablet Take 1 Tab by mouth nightly. 90 Tab 3    simvastatin (ZOCOR) 10 mg tablet Take  by mouth nightly.       Diabetic Shoes XX Patient has diabetic foot neuropathy. He needs diabetic shoes to help with his diagnosis. Diabetic neuropathy: Code 250.60 1 Device 0    Lancets (MICROLET LANCET) misc Test  Each 11    aspirin 81 mg chewable tablet Take 1 Tab by mouth daily. 30 Tab 11    clopidogrel (PLAVIX) 75 mg tablet Take 1 Tab by mouth daily. 30 Tab 11    valsartan-hydroCHLOROthiazide (DIOVAN-HCT) 160-25 mg per tablet TAKE 1 TABLET DAILY 90 Tab 2    pneumococcal 13 noman conj dip (PREVNAR 13, PF,) 0.5 mL syrg injection 0.5 mL by IntraMUSCular route PRIOR TO DISCHARGE for 1 dose. 1 Syringe 0    pravastatin (PRAVACHOL) 40 mg tablet Take 40 mg by mouth nightly. Indications: HYPERCHOLESTEROLEMIA       No Known Allergies    Family History   Problem Relation Age of Onset    Cancer Mother         ovarian    Cancer Brother         Prostate    Hypertension Paternal Uncle     Heart Disease Paternal Uncle     Alcohol abuse Father     No Known Problems Brother     No Known Problems Brother     No Known Problems Brother      Social History     Tobacco Use    Smoking status: Never Smoker    Smokeless tobacco: Never Used    Tobacco comment: occ   Substance Use Topics    Alcohol use: No     Comment: ETOH free for 2 years       Depression Risk Factor Screening:     3 most recent PHQ Screens 10/17/2019   Little interest or pleasure in doing things Not at all   Feeling down, depressed, irritable, or hopeless Not at all   Total Score PHQ 2 0       Alcohol Risk Factor Screening (MALE > 65): Do you average more 1 drink per night or more than 7 drinks a week: No    In the past three months have you have had more than 4 drinks containing alcohol on one occasion: No      Functional Ability and Level of Safety:   Hearing: Hearing is good. Activities of Daily Living: The home contains: no safety equipment.   Patient does total self care    Ambulation: with no difficulty    Fall Risk:  Fall Risk Assessment, last 12 mths 10/17/2019 Able to walk? Yes   Fall in past 12 months? No       Abuse Screen:  Patient is not abused    Cognitive Screening   Has your family/caregiver stated any concerns about your memory: no  Cognitive Screening: Normal    Patient Care Team   Patient Care Team:  Odalys Rodriguez MD as PCP - General (Internal Medicine)  Odalys Rodriguez MD as PCP - [facility] Provider  Heri Faulkner MD (Cardiology)  Cate Rodriguez MD (Neurology)    Assessment/Plan   Education and counseling provided:  Are appropriate based on today's review and evaluation    Diagnoses and all orders for this visit:    1. Type 2 diabetes mellitus with diabetic polyneuropathy, with long-term current use of insulin ([address]    2. Essential hypertension    3. Hypercholesteremia    4. Diabetic mononeuropathy associated with type 2 diabetes mellitus ([address]    5. Diabetic polyneuropathy associated with type 2 diabetes mellitus ([address]    6. Coronary artery disease involving native coronary artery of native heart without angina pectoris    7. Dysphagia, unspecified type  -     REFERRAL TO GASTROENTEROLOGY    8.  Colon cancer screening  -     REFERRAL TO GASTROENTEROLOGY  -     COLOGUARD TEST (FECAL DNA COLORECTAL CANCER SCREENING)        Health Maintenance Due   Topic Date Due    Colonoscopy  05/19/1967    Shingrix Vaccine Age 50> (1 of 2) 05/19/1999    Pneumococcal 65+ years (2 of 2 - PPSV23) 07/16/2018    Foot Exam Q1  04/13/2019    Medicare Yearly Exam  09/13/2019    A1C test (Diabetic or Prediabetic)  01/29/2020    MICROALBUMIN Q1  01/29/2020    Lipid Screen  01/29/2020

## 2020-02-25 NOTE — PROGRESS NOTES
SUBJECTIVE:   Mr. Vitaliy Bateman. is a 78 y.o. male who is here for follow up of routine medical issues. Previously saw Dr. Ruma Anders. Chief Complaint   Patient presents with    Hypertension     pt here today for routine f.u     He has some dysphagia at times. Sleeping \"the same. \" Gets 5 1/2 to 6 hours. Still gets fatigue in the middle of the afternoon. Just had eye injection this morning. Has been seeing Dr. Yael Bhat for what sounds like retinopathy; getting \"that needle in the eye. \"  However, lost to follow up in past year. Glc at home runs 130s - 140s. No longer seeing Dr. Dakhsa Linares. He sees Dr. Nusrat Fleming for CAD. Denies CP. He has severe neuralgias in feet, controlled by gabapentin. Dr. Ace Pool did EMG/NCV in Jan 2014. Last colonoscopy more than 10 years ago. At this time, he is otherwise doing well and has brought no other complaints to my attention today. For a list of the medical issues addressed today, see the assessment and plan below. PMH:   Past Medical History:   Diagnosis Date    CAD (coronary artery disease)     Diabetes ([facility]     Heart disease     Hypertension        Past Surgical History:   Procedure Laterality Date    CARDIAC SURG PROCEDURE UNLIST      HX HEMORRHOIDECTOMY         All: He has No Known Allergies. Current Outpatient Medications   Medication Sig    metoprolol tartrate (LOPRESSOR) 25 mg tablet Take 1 Tab by mouth two (2) times a day.     metFORMIN (GLUCOPHAGE) 500 mg tablet TAKE 1 TABLET TWICE A DAY WITH MEALS    gabapentin (NEURONTIN) 600 mg tablet TAKE 2 TABLETS THREE TIMES A DAY    losartan-hydroCHLOROthiazide (HYZAAR) 100-25 mg per tablet TAKE 1 TABLET DAILY    Insulin Needles, Disposable, (UNIFINE PENTIPS) 31 gauge x 1/4\" ndle USE AS DIRECTED    insulin detemir U-100 (LEVEMIR FLEXTOUCH U-100 INSULN) 100 unit/mL (3 mL) inpn INJECT 45 UNITS UNDER THE SKIN NIGHTLY    flash glucose scanning reader (FREESTYLE LOCO READER) misc Check Blood Glucose Three Times Daily Dx Code:E11.41, E11.42    flash glucose sensor (FREESTYLE LOCO SENSOR) kit Check Blood Glucose Three Times Daily Dx Code:E11.41, E11.42    traZODone (DESYREL) 50 mg tablet Take 1 Tab by mouth nightly.  simvastatin (ZOCOR) 10 mg tablet Take  by mouth nightly.  Diabetic Shoes XX Patient has diabetic foot neuropathy. He needs diabetic shoes to help with his diagnosis. Diabetic neuropathy: Code 250.60    Lancets (MICROLET LANCET) misc Test TID    aspirin 81 mg chewable tablet Take 1 Tab by mouth daily.  clopidogrel (PLAVIX) 75 mg tablet Take 1 Tab by mouth daily.  valsartan-hydroCHLOROthiazide (DIOVAN-HCT) 160-25 mg per tablet TAKE 1 TABLET DAILY    pneumococcal 13 noman conj dip (PREVNAR 13, PF,) 0.5 mL syrg injection 0.5 mL by IntraMUSCular route PRIOR TO DISCHARGE for 1 dose.  pravastatin (PRAVACHOL) 40 mg tablet Take 40 mg by mouth nightly. Indications: HYPERCHOLESTEROLEMIA     No current facility-administered medications for this visit. FH: His family history includes Heart Disease in his paternal uncle; and Hypertension in his paternal uncle. Mother  of cancer of some type. Father is living. His brother had prostate cancer. SH: . Retired from [employer], and from [employer], and from [employer]. He reports that he has never smoked. He has never used smokeless tobacco. He reports that he does not drink alcohol or use drugs. ROS: See above; Complete ROS otherwise negative. OBJECTIVE:   Vitals:   Visit Vitals  /74 (BP 1 Location: Left arm, BP Patient Position: Sitting)   Pulse (!) 54   Temp 98.5 °F (36.9 °C) (Oral)   Resp 18   Ht 5' 6\" (1.676 m)   Wt 206 lb 3.2 oz (93.5 kg)   SpO2 96%   BMI 33.28 kg/m²      Gen: Pleasant 78 y.o.  male in NAD. HEENT: PERRLA. EOMI. OP moist and pink. Neck: Supple. No LAD. HEART: RRR, No M/G/R.    LUNGS: CTAB No W/R. ABDOMEN: S, NT, ND, BS+. EXTREMITIES: Warm.  No C/C/E.  MUSCULOSKELETAL: Normal ROM, muscle strength 5/5 all groups. NEURO: Alert and oriented x 3. Cranial nerves grossly intact. No focal sensory or motor deficits noted. SKIN: Warm. Dry. No rashes or other lesions noted. Lab Results   Component Value Date/Time    Sodium 141 01/29/2019 04:11 PM    Potassium 4.8 01/29/2019 04:11 PM    Chloride 102 01/29/2019 04:11 PM    CO2 23 01/29/2019 04:11 PM    Anion gap 12 07/17/2013 03:25 AM    Glucose 296 (H) 01/29/2019 04:11 PM    BUN 20 01/29/2019 04:11 PM    Creatinine 1.07 01/29/2019 04:11 PM    BUN/Creatinine ratio 19 01/29/2019 04:11 PM    GFR est AA 81 01/29/2019 04:11 PM    GFR est non-AA 70 01/29/2019 04:11 PM    Calcium 8.8 01/29/2019 04:11 PM    Bilirubin, total 0.4 01/29/2019 04:11 PM    ALT (SGPT) 27 01/29/2019 04:11 PM    AST (SGOT) 25 01/29/2019 04:11 PM    Alk. phosphatase 76 01/29/2019 04:11 PM    Protein, total 6.1 01/29/2019 04:11 PM    Albumin 4.1 01/29/2019 04:11 PM    Globulin 3.1 07/14/2013 09:50 AM    A-G Ratio 2.1 01/29/2019 04:11 PM       Lab Results   Component Value Date/Time    Cholesterol, total 156 01/29/2019 04:11 PM    HDL Cholesterol 42 01/29/2019 04:11 PM    LDL, calculated 97 01/29/2019 04:11 PM    Triglyceride 85 01/29/2019 04:11 PM    CHOL/HDL Ratio 5.1 (H) 07/15/2013 09:10 AM        Lab Results   Component Value Date/Time    Hemoglobin A1c 11.5 (H) 01/29/2019 04:11 PM       Lab Results   Component Value Date/Time    WBC 6.5 01/29/2019 04:11 PM    HGB 15.3 01/29/2019 04:11 PM    HCT 47.1 01/29/2019 04:11 PM    PLATELET 332 65/77/1906 04:11 PM    MCV 95 01/29/2019 04:11 PM         ASSESSMENT/ PLAN: Silvia Wang was seen today for follow up. DM (diabetes mellitus): Not controlled at last check. Continue insulin. Seeing Dr. Andrey Jones with Endocrinology. Insomnia: Improved. trazodone. Anxiety: Bedtime trazodone. Consider other agents down the road. HTN (hypertension): Excellent BP today. Hypercholesteremia  - LIPID PANEL    Coronary artery disease: No CP today.  F/U with Cardiology. Diabetic neuropathy: Ongoing, stable. Gapabentin helps. - gabapentin (NEURONTIN) 600 mg tablet; Take 2 tablets by mouth three (3) times daily. Diabetic retinopathy: Seeing ophthalmologist.     Colon cancer screening: Previously have referred to GI. He is disinclined to follow through    RTC 4-6 months DM    I have reviewed the patient's medications and risks/side effects/benefits were discussed. Diagnosis(-es) explained to patient and questions answered. Literature provided where appropriate.

## 2020-02-25 NOTE — PATIENT INSTRUCTIONS
Office Policies Phone calls/patient messages: Please allow up to 24 hours for someone in the office to contact you about your call or message. Be mindful your provider may be out of the office or your message may require further review. We encourage you to use [employer] for your messages as this is a faster, more efficient way to communicate with our office Medication Refills: 
         
Prescription medications require 48-72 business hours to process. We encourage you to use [employer] for your refills. For controlled medications: Please allow 72 business hours to process. Certain medications may require you to  a written prescription at our office. NO narcotic/controlled medications will be prescribed after 4pm Monday through Friday or on weekends Form/Paperwork Completion: 
         
Please note a $25 fee may incur for all paperwork for completed by our providers. We ask that you allow 7-10 business days. Pre-payment is due prior to picking up/faxing the completed form. You may also download your forms to [employer] to have your doctor print off. 
 
 
1. Have you been to the ER, urgent care clinic since your last visit? Hospitalized since your last visit?no 2. Have you seen or consulted any other health care providers outside of the [address] [place] [address] since your last visit? Include any pap smears or colon screening. no 
 
Medicare Wellness Visit, Male The best way to live healthy is to have a lifestyle where you eat a well-balanced diet, exercise regularly, limit alcohol use, and quit all forms of tobacco/nicotine, if applicable. Regular preventive services are another way to keep healthy. Preventive services (vaccines, screening tests, monitoring & exams) can help personalize your care plan, which helps you manage your own care. Screening tests can find health problems at the earliest stages, when they are easiest to treat. [facility] follows the current, evidence-based guidelines published by the [facility] ([facility]) when recommending preventive services for our patients. Because we follow these guidelines, sometimes recommendations change over time as research supports it. (For example, a prostate screening blood test is no longer routinely recommended for men with no symptoms). Of course, you and your doctor may decide to screen more often for some diseases, based on your risk and co-morbidities (chronic disease you are already diagnosed with). Preventive services for you include: - Medicare offers their members a free annual wellness visit, which is time for you and your primary care provider to discuss and plan for your preventive service needs. Take advantage of this benefit every year! 
-All adults over age 72 should receive the recommended pneumonia vaccines. Current USPSTF guidelines recommend a series of two vaccines for the best pneumonia protection.  
-All adults should have a flu vaccine yearly and tetanus vaccine every 10 years. 
-All adults age 48 and older should receive the shingles vaccines (series of two vaccines). -All adults age 38-68 who are overweight should have a diabetes screening test once every three years.  
-Other screening tests & preventive services for persons with diabetes include: an eye exam to screen for diabetic retinopathy, a kidney function test, a foot exam, and stricter control over your cholesterol.  
-Cardiovascular screening for adults with routine risk involves an electrocardiogram (ECG) at intervals determined by the provider.  
-Colorectal cancer screening should be done for adults age 54-65 with no increased risk factors for colorectal cancer. There are a number of acceptable methods of screening for this type of cancer. Each test has its own benefits and drawbacks.  Discuss with your provider what is most appropriate for you during your annual wellness visit. The different tests include: colonoscopy (considered the best screening method), a fecal occult blood test, a fecal DNA test, and sigmoidoscopy. 
-All adults born between [facility] should be screened once for Hepatitis C. 
-An Abdominal Aortic Aneurysm (AAA) Screening is recommended for men age 73-68 who has ever smoked in their lifetime. Here is a list of your current Health Maintenance items (your personalized list of preventive services) with a due date: 
Health Maintenance Due Topic Date Due  
 Colonoscopy  05/19/1967  Shingles Vaccine (1 of 2) 05/19/1999  Pneumococcal Vaccine (2 of 2 - PPSV23) 07/16/2018 [facility] Diabetic Foot Care  04/13/2019 [facility] Annual Well Visit  09/13/2019  Hemoglobin A1C    01/29/2020  Albumin Urine Test  01/29/2020  Cholesterol Test   01/29/2020

## 2020-02-26 LAB
ALBUMIN SERPL-MCNC: 4.3 G/DL (ref 3.8–4.8)
ALBUMIN/CREAT UR: 26 MG/G CREAT (ref 0–29)
ALBUMIN/GLOB SERPL: 2.3 {RATIO} (ref 1.2–2.2)
ALP SERPL-CCNC: 76 IU/L (ref 39–117)
ALT SERPL-CCNC: 16 IU/L (ref 0–44)
AST SERPL-CCNC: 16 IU/L (ref 0–40)
BASOPHILS # BLD AUTO: 0.1 X10E3/UL (ref 0–0.2)
BASOPHILS NFR BLD AUTO: 1 %
BILIRUB SERPL-MCNC: 0.7 MG/DL (ref 0–1.2)
BUN SERPL-MCNC: 20 MG/DL (ref 8–27)
BUN/CREAT SERPL: 18 (ref 10–24)
CALCIUM SERPL-MCNC: 9.3 MG/DL (ref 8.6–10.2)
CHLORIDE SERPL-SCNC: 101 MMOL/L (ref 96–106)
CHOLEST SERPL-MCNC: 171 MG/DL (ref 100–199)
CO2 SERPL-SCNC: 20 MMOL/L (ref 20–29)
CREAT SERPL-MCNC: 1.11 MG/DL (ref 0.76–1.27)
CREAT UR-MCNC: 128.4 MG/DL
EOSINOPHIL # BLD AUTO: 0.1 X10E3/UL (ref 0–0.4)
EOSINOPHIL NFR BLD AUTO: 1 %
ERYTHROCYTE [DISTWIDTH] IN BLOOD BY AUTOMATED COUNT: 12.4 % (ref 11.6–15.4)
EST. AVERAGE GLUCOSE BLD GHB EST-MCNC: 295 MG/DL
GLOBULIN SER CALC-MCNC: 1.9 G/DL (ref 1.5–4.5)
GLUCOSE SERPL-MCNC: 251 MG/DL (ref 65–99)
HBA1C MFR BLD: 11.9 % (ref 4.8–5.6)
HCT VFR BLD AUTO: 48.5 % (ref 37.5–51)
HDLC SERPL-MCNC: 42 MG/DL
HGB BLD-MCNC: 16.3 G/DL (ref 13–17.7)
IMM GRANULOCYTES # BLD AUTO: 0 X10E3/UL (ref 0–0.1)
IMM GRANULOCYTES NFR BLD AUTO: 0 %
LDLC SERPL CALC-MCNC: 104 MG/DL (ref 0–99)
LYMPHOCYTES # BLD AUTO: 2.6 X10E3/UL (ref 0.7–3.1)
LYMPHOCYTES NFR BLD AUTO: 37 %
MCH RBC QN AUTO: 32 PG (ref 26.6–33)
MCHC RBC AUTO-ENTMCNC: 33.6 G/DL (ref 31.5–35.7)
MCV RBC AUTO: 95 FL (ref 79–97)
MICROALBUMIN UR-MCNC: 32.8 UG/ML
MONOCYTES # BLD AUTO: 0.5 X10E3/UL (ref 0.1–0.9)
MONOCYTES NFR BLD AUTO: 8 %
NEUTROPHILS # BLD AUTO: 3.7 X10E3/UL (ref 1.4–7)
NEUTROPHILS NFR BLD AUTO: 53 %
PLATELET # BLD AUTO: 201 X10E3/UL (ref 150–450)
POTASSIUM SERPL-SCNC: 4.5 MMOL/L (ref 3.5–5.2)
PROT SERPL-MCNC: 6.2 G/DL (ref 6–8.5)
RBC # BLD AUTO: 5.1 X10E6/UL (ref 4.14–5.8)
SODIUM SERPL-SCNC: 137 MMOL/L (ref 134–144)
TRIGL SERPL-MCNC: 126 MG/DL (ref 0–149)
VLDLC SERPL CALC-MCNC: 25 MG/DL (ref 5–40)
WBC # BLD AUTO: 6.9 X10E3/UL (ref 3.4–10.8)

## 2020-02-28 NOTE — PROGRESS NOTES
DM uncontrolled. Let's have him go up 5 units on the insulin Levemir. Let's have him also try to get back in with Dr. Bj Li.  BJF

## 2020-03-02 RX ORDER — LOSARTAN POTASSIUM AND HYDROCHLOROTHIAZIDE 25; 100 MG/1; MG/1
TABLET ORAL
Qty: 90 TAB | Refills: 4 | Status: SHIPPED | OUTPATIENT
Start: 2020-03-02 | End: 2020-06-08 | Stop reason: RX

## 2020-04-15 DIAGNOSIS — E11.41 DIABETIC MONONEUROPATHY ASSOCIATED WITH TYPE 2 DIABETES MELLITUS (HCC): ICD-10-CM

## 2020-04-16 RX ORDER — GABAPENTIN 600 MG/1
TABLET ORAL
Qty: 540 TAB | Refills: 2 | Status: SHIPPED | OUTPATIENT
Start: 2020-04-16 | End: 2020-10-23

## 2020-06-05 ENCOUNTER — TELEPHONE (OUTPATIENT)
Dept: INTERNAL MEDICINE CLINIC | Age: 70
End: 2020-06-05

## 2020-06-05 DIAGNOSIS — I10 ESSENTIAL HYPERTENSION: Primary | ICD-10-CM

## 2020-06-05 NOTE — TELEPHONE ENCOUNTER
Today our office received a fax from [facility] stating that losartan-hctz is temporarily out of stock asking for an alternative.

## 2020-06-08 RX ORDER — HYDROCHLOROTHIAZIDE 25 MG/1
25 TABLET ORAL DAILY
Qty: 30 TAB | Refills: 2 | Status: SHIPPED | OUTPATIENT
Start: 2020-06-08 | End: 2020-10-16 | Stop reason: SDUPTHER

## 2020-06-08 RX ORDER — LOSARTAN POTASSIUM 100 MG/1
100 TABLET ORAL DAILY
Qty: 90 TAB | Refills: 2 | Status: SHIPPED | OUTPATIENT
Start: 2020-06-08 | End: 2020-06-09 | Stop reason: SDUPTHER

## 2020-06-08 NOTE — TELEPHONE ENCOUNTER
Per verbal read back  Losartan and HCTZ   Were e-scribed to pharmacy, because combination out of stock

## 2020-06-09 DIAGNOSIS — I10 ESSENTIAL HYPERTENSION: ICD-10-CM

## 2020-06-09 RX ORDER — LOSARTAN POTASSIUM 100 MG/1
100 TABLET ORAL DAILY
Qty: 90 TAB | Refills: 2 | Status: SHIPPED | OUTPATIENT
Start: 2020-06-09 | End: 2020-10-16 | Stop reason: SDUPTHER

## 2020-06-09 NOTE — TELEPHONE ENCOUNTER
Patient's wife, Elliott Whitfield states Losartan refill thru [facility] is not availble as they report they're Out of Stock. Elliott Whitfield is requesting that refill be done thru [facility]. Please call if any questions or when done.  Thank you

## 2020-06-10 RX ORDER — INSULIN DETEMIR 100 [IU]/ML
45 INJECTION, SOLUTION SUBCUTANEOUS
Qty: 42 ML | Refills: 0 | Status: SHIPPED | OUTPATIENT
Start: 2020-06-10 | End: 2022-03-09 | Stop reason: SDUPTHER

## 2020-06-24 ENCOUNTER — TELEPHONE (OUTPATIENT)
Dept: INTERNAL MEDICINE CLINIC | Age: 70
End: 2020-06-24

## 2020-06-24 NOTE — TELEPHONE ENCOUNTER
----- Message from Krys Homewood sent at 6/24/2020 12:33 PM EDT -----  Regarding: Dr. Bora Amador / telephone  Contact: [phone]  Caller's first and last name: Prem Christopher (wife)   Reason for call: Pt. needs to be sent another \"cologuard\" test.   Callback required yes/no and why: yes  Best contact number(s): [phone]  Details to clarify the request: n/a

## 2020-06-29 RX ORDER — PEN NEEDLE, DIABETIC 29 G X1/2"
NEEDLE, DISPOSABLE MISCELLANEOUS
Qty: 100 PEN NEEDLE | Refills: 11 | Status: SHIPPED | OUTPATIENT
Start: 2020-06-29 | End: 2021-10-27

## 2020-08-25 ENCOUNTER — TELEPHONE (OUTPATIENT)
Dept: INTERNAL MEDICINE CLINIC | Age: 70
End: 2020-08-25

## 2020-08-25 ENCOUNTER — VIRTUAL VISIT (OUTPATIENT)
Dept: INTERNAL MEDICINE CLINIC | Age: 70
End: 2020-08-25
Payer: MEDICARE

## 2020-08-25 DIAGNOSIS — E11.42 TYPE 2 DIABETES MELLITUS WITH DIABETIC POLYNEUROPATHY, WITH LONG-TERM CURRENT USE OF INSULIN (HCC): Primary | ICD-10-CM

## 2020-08-25 DIAGNOSIS — Z79.4 TYPE 2 DIABETES MELLITUS WITH DIABETIC POLYNEUROPATHY, WITH LONG-TERM CURRENT USE OF INSULIN (HCC): Primary | ICD-10-CM

## 2020-08-25 DIAGNOSIS — I10 ESSENTIAL HYPERTENSION: ICD-10-CM

## 2020-08-25 DIAGNOSIS — E11.42 DIABETIC POLYNEUROPATHY ASSOCIATED WITH TYPE 2 DIABETES MELLITUS (HCC): ICD-10-CM

## 2020-08-25 DIAGNOSIS — E78.00 HYPERCHOLESTEREMIA: ICD-10-CM

## 2020-08-25 DIAGNOSIS — H35.00 RETINOPATHY: ICD-10-CM

## 2020-08-25 DIAGNOSIS — I25.10 CORONARY ARTERY DISEASE INVOLVING NATIVE CORONARY ARTERY OF NATIVE HEART WITHOUT ANGINA PECTORIS: ICD-10-CM

## 2020-08-25 PROCEDURE — 99441 PR PHYS/QHP TELEPHONE EVALUATION 5-10 MIN: CPT | Performed by: INTERNAL MEDICINE

## 2020-08-25 NOTE — PROGRESS NOTES
Dany Ibarra is a 69 y.o. male, evaluated via audio-only technology on 8/25/2020 for Hypertension (6 month f.u)    12  Subjective:     SUBJECTIVE:   Mr. Dany Ibarra is a 69 y.o. male who is here for follow up of routine medical issues. Previously saw Dr. Isaac Bolanos. Chief Complaint   Patient presents with    Hypertension     6 month f.u     He has some dysphagia at times. \"Yeah, I need to get that check. \" \"I need to take smaller bites. \" No worse. Sleeping \"I cat nap a lot. \" Gets 5 1/2 to 6 hours. Still gets fatigue in the middle of the afternoon. Has been seeing Dr. Alejandra Willams for what sounds like retinopathy; getting \"that needle in the eye. \"  Due for follow up, but he is putting this off due to April. Glc at home runs 130s - 140s. No longer seeing Dr. Elena Meyers. He sees Dr. Casper Boss for CAD. Denies CP. He has severe neuralgias in feet, controlled by gabapentin. Dr. Kings Mendez did EMG/NCV in Jan 2014. Last colonoscopy more than 10 years ago. At this time, he is otherwise doing well and has brought no other complaints to my attention today. For a list of the medical issues addressed today, see the assessment and plan below. PMH:   Past Medical History:   Diagnosis Date    CAD (coronary artery disease)     Diabetes ([facility]     Heart disease     Hypertension        Past Surgical History:   Procedure Laterality Date    CARDIAC SURG PROCEDURE UNLIST      HX HEMORRHOIDECTOMY         All: He has No Known Allergies. Current Outpatient Medications   Medication Sig    Insulin Needles, Disposable, (Unifine Pentips) 31 gauge x 1/4\" ndle USE AS DIRECTED    insulin detemir U-100 (Levemir FlexTouch U-100 Insuln) 100 unit/mL (3 mL) inpn 45 Units by SubCUTAneous route nightly for 90 days. INJECT 45 UNITS UNDER THE SKIN NIGHTLY    losartan (COZAAR) 100 mg tablet Take 1 Tab by mouth daily for 90 days.  Indications: high blood pressure    hydroCHLOROthiazide (HYDRODIURIL) 25 mg tablet Take 1 Tab by mouth daily for 90 days. Indications: high blood pressure    gabapentin (NEURONTIN) 600 mg tablet TAKE 2 TABLETS THREE TIMES A DAY    metoprolol tartrate (LOPRESSOR) 25 mg tablet Take 1 Tab by mouth two (2) times a day.  metFORMIN (GLUCOPHAGE) 500 mg tablet TAKE 1 TABLET TWICE A DAY WITH MEALS    flash glucose scanning reader (FREESTYLE LOCO READER) misc Check Blood Glucose Three Times Daily Dx Code:E11.41, E11.42    flash glucose sensor (FREESTYLE LOCO SENSOR) kit Check Blood Glucose Three Times Daily Dx Code:E11.41, E11.42    traZODone (DESYREL) 50 mg tablet Take 1 Tab by mouth nightly.  simvastatin (ZOCOR) 10 mg tablet Take  by mouth nightly.  Diabetic Shoes XX Patient has diabetic foot neuropathy. He needs diabetic shoes to help with his diagnosis. Diabetic neuropathy: Code 250.60    Lancets (MICROLET LANCET) misc Test TID    aspirin 81 mg chewable tablet Take 1 Tab by mouth daily.  clopidogrel (PLAVIX) 75 mg tablet Take 1 Tab by mouth daily.  pneumococcal 13 noman conj dip (PREVNAR 13, PF,) 0.5 mL syrg injection 0.5 mL by IntraMUSCular route PRIOR TO DISCHARGE for 1 dose.  pravastatin (PRAVACHOL) 40 mg tablet Take 40 mg by mouth nightly. Indications: HYPERCHOLESTEROLEMIA     No current facility-administered medications for this visit. FH: His family history includes Heart Disease in his paternal uncle; and Hypertension in his paternal uncle. Mother  of cancer of some type. Father is living. His brother had prostate cancer. SH: . Retired from [employer], and from [employer], and from [employer]. He reports that he has never smoked. He has never used smokeless tobacco. He reports that he does not drink alcohol or use drugs. ROS: See above; Complete ROS otherwise negative. OBJECTIVE:   Vitals: There were no vitals taken for this visit. Gen: Pleasant 69 y.o.  male in NAD.       Lab Results   Component Value Date/Time    Sodium 137 2020 11:51 AM Potassium 4.5 02/25/2020 11:51 AM    Chloride 101 02/25/2020 11:51 AM    CO2 20 02/25/2020 11:51 AM    Anion gap 12 07/17/2013 03:25 AM    Glucose 251 (H) 02/25/2020 11:51 AM    BUN 20 02/25/2020 11:51 AM    Creatinine 1.11 02/25/2020 11:51 AM    BUN/Creatinine ratio 18 02/25/2020 11:51 AM    GFR est AA 77 02/25/2020 11:51 AM    GFR est non-AA 67 02/25/2020 11:51 AM    Calcium 9.3 02/25/2020 11:51 AM    Bilirubin, total 0.7 02/25/2020 11:51 AM    ALT (SGPT) 16 02/25/2020 11:51 AM    Alk. phosphatase 76 02/25/2020 11:51 AM    Protein, total 6.2 02/25/2020 11:51 AM    Albumin 4.3 02/25/2020 11:51 AM    Globulin 3.1 07/14/2013 09:50 AM    A-G Ratio 2.3 (H) 02/25/2020 11:51 AM       Lab Results   Component Value Date/Time    Cholesterol, total 171 02/25/2020 11:51 AM    HDL Cholesterol 42 02/25/2020 11:51 AM    LDL, calculated 104 (H) 02/25/2020 11:51 AM    Triglyceride 126 02/25/2020 11:51 AM    CHOL/HDL Ratio 5.1 (H) 07/15/2013 09:10 AM        Lab Results   Component Value Date/Time    Hemoglobin A1c 11.9 (H) 02/25/2020 11:51 AM       Lab Results   Component Value Date/Time    WBC 6.9 02/25/2020 11:51 AM    HGB 16.3 02/25/2020 11:51 AM    HCT 48.5 02/25/2020 11:51 AM    PLATELET 198 29/65/5411 11:51 AM    MCV 95 02/25/2020 11:51 AM         ASSESSMENT/ PLAN: Jeri Miramontes was seen today for follow up. DM (diabetes mellitus): Not controlled at last check. Continue insulin. Had been seeing Dr. Sofie Carranza with Endocrinology--on hold. Insomnia: Improved. trazodone. Anxiety: Bedtime trazodone. Consider other agents down the road. HTN (hypertension): Excellent BP today. Hypercholesteremia  - LIPID PANEL    Coronary artery disease: No CP today. F/U with Cardiology. Diabetic neuropathy: Ongoing, stable. Gapabentin helps. - gabapentin (NEURONTIN) 600 mg tablet; Take 2 tablets by mouth three (3) times daily.     Diabetic retinopathy: Seeing ophthalmologist.     Colon cancer screening: Previously have referred to GI. He is disinclined to follow through    RTC 4-6 months DM    I have reviewed the patient's medications and risks/side effects/benefits were discussed. Diagnosis(-es) explained to patient and questions answered. Literature provided where appropriate. No flowsheet data found. Luis Felipe Justus, who was evaluated through a patient-initiated, synchronous (real-time) audio only encounter, and/or her healthcare decision maker, is aware that it is a billable service, with coverage as determined by his insurance carrier. He provided verbal consent to proceed: Yes. He has not had a related appointment within my department in the past 7 days or scheduled within the next 24 hours.       Total Time: minutes: 5-10 minutes    Filiberto Tony MD

## 2020-10-15 ENCOUNTER — TELEPHONE (OUTPATIENT)
Dept: INTERNAL MEDICINE CLINIC | Age: 70
End: 2020-10-15

## 2020-10-15 DIAGNOSIS — I10 ESSENTIAL HYPERTENSION: ICD-10-CM

## 2020-10-15 NOTE — TELEPHONE ENCOUNTER
Today our office received a fax form [facility] stating that losartan/hctz 100/25mg is out of stock asking for alternative. Pt's wife states that in the past Dr Shireen Norwood has  the drugs.

## 2020-10-16 RX ORDER — HYDROCHLOROTHIAZIDE 25 MG/1
25 TABLET ORAL DAILY
Qty: 30 TAB | Refills: 2 | Status: SHIPPED | OUTPATIENT
Start: 2020-10-16 | End: 2021-01-14

## 2020-10-16 RX ORDER — LOSARTAN POTASSIUM 100 MG/1
100 TABLET ORAL DAILY
Qty: 90 TAB | Refills: 2 | Status: SHIPPED | OUTPATIENT
Start: 2020-10-16 | End: 2021-01-14

## 2020-10-20 DIAGNOSIS — E11.41 DIABETIC MONONEUROPATHY ASSOCIATED WITH TYPE 2 DIABETES MELLITUS (HCC): ICD-10-CM

## 2020-10-23 RX ORDER — GABAPENTIN 600 MG/1
TABLET ORAL
Qty: 540 TAB | Refills: 2 | Status: SHIPPED | OUTPATIENT
Start: 2020-10-23 | End: 2021-05-21 | Stop reason: SDUPTHER

## 2020-10-29 RX ORDER — METOPROLOL TARTRATE 25 MG/1
TABLET, FILM COATED ORAL
Qty: 180 TAB | Refills: 3 | Status: SHIPPED | OUTPATIENT
Start: 2020-10-29 | End: 2021-10-25

## 2020-11-16 LAB — COLOGUARD TEST, EXTERNAL: NEGATIVE

## 2021-01-27 RX ORDER — METFORMIN HYDROCHLORIDE 500 MG/1
TABLET ORAL
Qty: 180 TAB | Refills: 3 | Status: SHIPPED | OUTPATIENT
Start: 2021-01-27 | End: 2022-01-24

## 2021-02-24 ENCOUNTER — TELEPHONE (OUTPATIENT)
Dept: INTERNAL MEDICINE CLINIC | Age: 71
End: 2021-02-24

## 2021-02-24 ENCOUNTER — VIRTUAL VISIT (OUTPATIENT)
Dept: INTERNAL MEDICINE CLINIC | Age: 71
End: 2021-02-24
Payer: MEDICARE

## 2021-02-24 DIAGNOSIS — Z00.00 MEDICARE ANNUAL WELLNESS VISIT, SUBSEQUENT: Primary | ICD-10-CM

## 2021-02-24 PROCEDURE — 3017F COLORECTAL CA SCREEN DOC REV: CPT | Performed by: INTERNAL MEDICINE

## 2021-02-24 PROCEDURE — G8417 CALC BMI ABV UP PARAM F/U: HCPCS | Performed by: INTERNAL MEDICINE

## 2021-02-24 PROCEDURE — G8432 DEP SCR NOT DOC, RNG: HCPCS | Performed by: INTERNAL MEDICINE

## 2021-02-24 PROCEDURE — G0439 PPPS, SUBSEQ VISIT: HCPCS | Performed by: INTERNAL MEDICINE

## 2021-02-24 PROCEDURE — G8427 DOCREV CUR MEDS BY ELIG CLIN: HCPCS | Performed by: INTERNAL MEDICINE

## 2021-02-24 PROCEDURE — G8756 NO BP MEASURE DOC: HCPCS | Performed by: INTERNAL MEDICINE

## 2021-02-24 PROCEDURE — G8536 NO DOC ELDER MAL SCRN: HCPCS | Performed by: INTERNAL MEDICINE

## 2021-02-24 PROCEDURE — 1101F PT FALLS ASSESS-DOCD LE1/YR: CPT | Performed by: INTERNAL MEDICINE

## 2021-02-24 NOTE — PATIENT INSTRUCTIONS
Medicare Wellness Visit, Male The best way to live healthy is to have a lifestyle where you eat a well-balanced diet, exercise regularly, limit alcohol use, and quit all forms of tobacco/nicotine, if applicable. Regular preventive services are another way to keep healthy. Preventive services (vaccines, screening tests, monitoring & exams) can help personalize your care plan, which helps you manage your own care. Screening tests can find health problems at the earliest stages, when they are easiest to treat. [facility] follows the current, evidence-based guidelines published by the [facility] ([facility]) when recommending preventive services for our patients. Because we follow these guidelines, sometimes recommendations change over time as research supports it. (For example, a prostate screening blood test is no longer routinely recommended for men with no symptoms). Of course, you and your doctor may decide to screen more often for some diseases, based on your risk and co-morbidities (chronic disease you are already diagnosed with). Preventive services for you include: - Medicare offers their members a free annual wellness visit, which is time for you and your primary care provider to discuss and plan for your preventive service needs. Take advantage of this benefit every year! 
-All adults over age 72 should receive the recommended pneumonia vaccines. Current USPSTF guidelines recommend a series of two vaccines for the best pneumonia protection.  
-All adults should have a flu vaccine yearly and tetanus vaccine every 10 years. 
-All adults age 48 and older should receive the shingles vaccines (series of two vaccines). -All adults age 38-68 who are overweight should have a diabetes screening test once every three years. -Other screening tests & preventive services for persons with diabetes include: an eye exam to screen for diabetic retinopathy, a kidney function test, a foot exam, and stricter control over your cholesterol.  
-Cardiovascular screening for adults with routine risk involves an electrocardiogram (ECG) at intervals determined by the provider.  
-Colorectal cancer screening should be done for adults age 54-65 with no increased risk factors for colorectal cancer. There are a number of acceptable methods of screening for this type of cancer. Each test has its own benefits and drawbacks. Discuss with your provider what is most appropriate for you during your annual wellness visit. The different tests include: colonoscopy (considered the best screening method), a fecal occult blood test, a fecal DNA test, and sigmoidoscopy. 
-All adults born between [facility] should be screened once for Hepatitis C. 
-An Abdominal Aortic Aneurysm (AAA) Screening is recommended for men age 73-68 who has ever smoked in their lifetime. Here is a list of your current Health Maintenance items (your personalized list of preventive services) with a due date: 
Health Maintenance Due Topic Date Due  
 COVID-19 Vaccine (1 of 2) 05[birth year]19/1965  Shingles Vaccine (1 of 2) 05/19/1999  Pneumococcal Vaccine (2 of 2 - PPSV23) 07/16/2018 [address] [place] [address] Diabetic Foot Care  04/13/2019  Hemoglobin A1C    05/25/2020  Yearly Flu Vaccine (1) 09/01/2020  Albumin Urine Test  02/25/2021  Cholesterol Test   02/25/2021  Glaucoma Screening   03/07/2021 [address] [place] [address] Eye Exam  03/07/2021

## 2021-02-24 NOTE — PROGRESS NOTES
This is the Subsequent Medicare Annual Wellness Exam, performed 12 months or more after the Initial AWV or the last Subsequent AWV    I have reviewed the patient's medical history in detail and updated the computerized patient record. Depression Risk Factor Screening:     3 most recent PHQ Screens 8/25/2020   Little interest or pleasure in doing things Not at all   Feeling down, depressed, irritable, or hopeless Not at all   Total Score PHQ 2 0     Confirmed answers today. Alcohol Risk Screen    Do you average more than 1 drink per night or more than 7 drinks a week: No    In the past three months have you have had more than 4 drinks containing alcohol on one occasion: No        Functional Ability and Level of Safety:    Hearing: Hearing is good. Activities of Daily Living: The home contains: no safety equipment. Patient does total self care      Ambulation: with no difficulty     Fall Risk:  Fall Risk Assessment, last 12 mths 8/25/2020   Able to walk? Yes   Fall in past 12 months?  No      Abuse Screen:  Patient is not abused       Cognitive Screening    Has your family/caregiver stated any concerns about your memory: no    Cognitive Screening: Normal    Assessment/Plan   Education and counseling provided:  Are appropriate based on today's review and evaluation        Health Maintenance Due     Health Maintenance Due   Topic Date Due    COVID-19 Vaccine (1 of 2) [birth year]19/1965    Shingrix Vaccine Age 50> (1 of 2) 05/19/1999    Pneumococcal 65+ years (2 of 2 - PPSV23) 07/16/2018    Foot Exam Q1  04/13/2019    A1C test (Diabetic or Prediabetic)  05/25/2020    Flu Vaccine (1) 09/01/2020    Medicare Yearly Exam  02/25/2021    MICROALBUMIN Q1  02/25/2021    Lipid Screen  02/25/2021    GLAUCOMA SCREENING Q2Y  03/07/2021    Eye Exam Retinal or Dilated  03/07/2021       Patient Care Team   Patient Care Team:  Natalia Estes MD as PCP - General (Internal Medicine)  Natalia Estes MD as PCP - Sean Maki Dr Empaneled Provider  Elliott Washburn MD (Cardiology)  Cristina Mcconnell MD (Neurology)    History     Patient Active Problem List   Diagnosis Code    DM (diabetes mellitus) ([facility] E11.9    HTN (hypertension) I10    Hypercholesteremia E78.00    Diabetic neuropathy ([facility] E11.40    Coronary artery disease I25.10    H/O heart artery stent Z95.5    Essential hypertension I10    Diabetic polyneuropathy associated with type 2 diabetes mellitus ([facility] E11.42     Past Medical History:   Diagnosis Date    CAD (coronary artery disease)     Diabetes ([facility]     Heart disease     Hypertension       Past Surgical History:   Procedure Laterality Date    HX HEMORRHOIDECTOMY      [facility] CARDIAC SURG PROCEDURE UNLIST       Current Outpatient Medications   Medication Sig Dispense Refill    metFORMIN (GLUCOPHAGE) 500 mg tablet TAKE 1 TABLET TWICE A DAY WITH MEALS 180 Tab 3    metoprolol tartrate (LOPRESSOR) 25 mg tablet TAKE 1 TABLET TWICE A  Tab 3    gabapentin (NEURONTIN) 600 mg tablet TAKE 2 TABLETS THREE TIMES A  Tab 2    Insulin Needles, Disposable, (Unifine Pentips) 31 gauge x 1/4\" ndle USE AS DIRECTED 100 Pen Needle 11    flash glucose scanning reader (FREESTYLE LOCO READER) misc Check Blood Glucose Three Times Daily Dx Code:E11.41, E11.42 1 Device 0    flash glucose sensor (FREESTYLE LOCO SENSOR) kit Check Blood Glucose Three Times Daily Dx Code:E11.41, E11.42 3 Box 3    traZODone (DESYREL) 50 mg tablet Take 1 Tab by mouth nightly. 90 Tab 3    simvastatin (ZOCOR) 10 mg tablet Take  by mouth nightly.  Diabetic Shoes XX Patient has diabetic foot neuropathy. He needs diabetic shoes to help with his diagnosis. Diabetic neuropathy: Code 250.60 1 Device 0    Lancets (MICROLET LANCET) misc Test  Each 11    aspirin 81 mg chewable tablet Take 1 Tab by mouth daily. 30 Tab 11    clopidogrel (PLAVIX) 75 mg tablet Take 1 Tab by mouth daily.  30 Tab 11    pneumococcal 13 noman conj dip (PREVNAR 13, PF,) 0.5 mL syrg injection 0.5 mL by IntraMUSCular route PRIOR TO DISCHARGE for 1 dose. 1 Syringe 0    pravastatin (PRAVACHOL) 40 mg tablet Take 40 mg by mouth nightly. Indications: HYPERCHOLESTEROLEMIA       No Known Allergies    Family History   Problem Relation Age of Onset    Cancer Mother         ovarian    Cancer Brother         Prostate    Hypertension Paternal Uncle     Heart Disease Paternal Uncle     Alcohol abuse Father     No Known Problems Brother     No Known Problems Brother     No Known Problems Brother      Social History     Tobacco Use    Smoking status: Never Smoker    Smokeless tobacco: Never Used    Tobacco comment: occ   Substance Use Topics    Alcohol use: No     Comment: ETOH free for 2 years       Vickie LaraMariaa, who was evaluated through a synchronous (real-time) audio only encounter, and/or his healthcare decision maker, is aware that it is a billable service, with coverage as determined by his insurance carrier. He provided verbal consent to proceed: Yes, and patient identification was verified. It was conducted pursuant to the emergency declaration under the [facility], [address] [place] [address] authority and the [employer] and [employer] General Act. A caregiver was present when appropriate. Ability to conduct physical exam was limited. I was in the office. The patient was at home.     Vero Julian MD

## 2021-02-24 NOTE — PROGRESS NOTES
Jared Garcia is a 69 y.o. male, evaluated via audio-only technology on 2/24/2021 for Diabetes (6 month f.u) and Medication Evaluation (pt wants to disucss combinding the losartan & hydrochlorothaizide)    12  Subjective:     SUBJECTIVE:   Mr. Jared Garcia is a 69 y.o. male who is here for follow up of routine medical issues. Previously saw Dr. Chris Robbins. Chief Complaint   Patient presents with    Diabetes     6 month f.u    Medication Evaluation     pt wants to disucss combinding the losartan & hydrochlorothaizide       He has some dysphagia at times. \"It ain't no big deal.\" \"I need to take smaller bites. \" No worse. Has been seeing Dr. Heather Woodard for what sounds like retinopathy; getting \"that needle in the eye. \"  Due for follow up, but he is putting this off due to April. Glc at home runs 130s - 140s. No longer seeing Dr. Karen Richardson. He plans not to get labs \"while that virus is out there. \"     He sees Dr. Fransisco Reynoso for CAD. Denies CP. He has severe neuralgias in feet, controlled by gabapentin. Dr. Dot Hernandez did EMG/NCV in Jan 2014. Last colonoscopy more than 10 years ago. At this time, he is otherwise doing well and has brought no other complaints to my attention today. For a list of the medical issues addressed today, see the assessment and plan below. PMH:   Past Medical History:   Diagnosis Date    CAD (coronary artery disease)     Diabetes ([facility]     Heart disease     Hypertension        Past Surgical History:   Procedure Laterality Date    HX HEMORRHOIDECTOMY      [place] CARDIAC SURG PROCEDURE UNLIST         All: He has No Known Allergies.    Current Outpatient Medications   Medication Sig    metFORMIN (GLUCOPHAGE) 500 mg tablet TAKE 1 TABLET TWICE A DAY WITH MEALS    metoprolol tartrate (LOPRESSOR) 25 mg tablet TAKE 1 TABLET TWICE A DAY    gabapentin (NEURONTIN) 600 mg tablet TAKE 2 TABLETS THREE TIMES A DAY    Insulin Needles, Disposable, (Unifine Pentips) 31 gauge x 1/4\" ndle USE AS DIRECTED    flash glucose scanning reader (FREESTYLE LOCO READER) misc Check Blood Glucose Three Times Daily Dx Code:E11.41, E11.42    flash glucose sensor (FREESTYLE LOCO SENSOR) kit Check Blood Glucose Three Times Daily Dx Code:E11.41, E11.42    traZODone (DESYREL) 50 mg tablet Take 1 Tab by mouth nightly.  simvastatin (ZOCOR) 10 mg tablet Take  by mouth nightly.  Diabetic Shoes XX Patient has diabetic foot neuropathy. He needs diabetic shoes to help with his diagnosis. Diabetic neuropathy: Code 250.60    Lancets (MICROLET LANCET) misc Test TID    aspirin 81 mg chewable tablet Take 1 Tab by mouth daily.  clopidogrel (PLAVIX) 75 mg tablet Take 1 Tab by mouth daily.  pneumococcal 13 noman conj dip (PREVNAR 13, PF,) 0.5 mL syrg injection 0.5 mL by IntraMUSCular route PRIOR TO DISCHARGE for 1 dose.  pravastatin (PRAVACHOL) 40 mg tablet Take 40 mg by mouth nightly. Indications: HYPERCHOLESTEROLEMIA     No current facility-administered medications for this visit. FH: His family history includes Heart Disease in his paternal uncle; and Hypertension in his paternal uncle. Mother  of cancer of some type. Father is living. His brother had prostate cancer. SH: . Retired from [employer], and from [employer], and from [employer]. He reports that he has never smoked. He has never used smokeless tobacco. He reports that he does not drink alcohol or use drugs. ROS: See above; Complete ROS otherwise negative. OBJECTIVE:   Vitals: There were no vitals taken for this visit. Gen: Pleasant 69 y.o.  male in NAD.       Lab Results   Component Value Date/Time    Sodium 137 2020 11:51 AM    Potassium 4.5 2020 11:51 AM    Chloride 101 2020 11:51 AM    CO2 20 2020 11:51 AM    Anion gap 12 2013 03:25 AM    Glucose 251 (H) 2020 11:51 AM    BUN 20 2020 11:51 AM    Creatinine 1.11 2020 11:51 AM    BUN/Creatinine ratio 18 2020 11:51 AM    GFR est AA 77 02/25/2020 11:51 AM    GFR est non-AA 67 02/25/2020 11:51 AM    Calcium 9.3 02/25/2020 11:51 AM    Bilirubin, total 0.7 02/25/2020 11:51 AM    ALT (SGPT) 16 02/25/2020 11:51 AM    Alk. phosphatase 76 02/25/2020 11:51 AM    Protein, total 6.2 02/25/2020 11:51 AM    Albumin 4.3 02/25/2020 11:51 AM    Globulin 3.1 07/14/2013 09:50 AM    A-G Ratio 2.3 (H) 02/25/2020 11:51 AM       Lab Results   Component Value Date/Time    Cholesterol, total 171 02/25/2020 11:51 AM    HDL Cholesterol 42 02/25/2020 11:51 AM    LDL, calculated 104 (H) 02/25/2020 11:51 AM    Triglyceride 126 02/25/2020 11:51 AM    CHOL/HDL Ratio 5.1 (H) 07/15/2013 09:10 AM        Lab Results   Component Value Date/Time    Hemoglobin A1c 11.9 (H) 02/25/2020 11:51 AM       Lab Results   Component Value Date/Time    WBC 6.9 02/25/2020 11:51 AM    HGB 16.3 02/25/2020 11:51 AM    HCT 48.5 02/25/2020 11:51 AM    PLATELET 797 22/71/9716 11:51 AM    MCV 95 02/25/2020 11:51 AM         ASSESSMENT/ PLAN: Anderson Cobos was seen today for follow up. DM (diabetes mellitus): Not controlled at last check. Continue insulin. Had been seeing Dr. Normajean Apley with Endocrinology--on hold. Insomnia: Improved. trazodone. Anxiety: Bedtime trazodone. Consider other agents down the road. HTN (hypertension): Excellent BP today. Hypercholesteremia  - LIPID PANEL    Coronary artery disease: No CP today. F/U with Cardiology. Diabetic neuropathy: Ongoing, stable. Gapabentin helps. - gabapentin (NEURONTIN) 600 mg tablet; Take 2 tablets by mouth three (3) times daily. Diabetic retinopathy: Seeing ophthalmologist.     Colon cancer screening: Previously have referred to GI. He is disinclined to follow through    RTC 4-6 months DM    I have reviewed the patient's medications and risks/side effects/benefits were discussed. Diagnosis(-es) explained to patient and questions answered. Literature provided where appropriate.              No flowsheet data found.     Cl Comer., who was evaluated through a patient-initiated, synchronous (real-time) audio only encounter, and/or her healthcare decision maker, is aware that it is a billable service, with coverage as determined by his insurance carrier. He provided verbal consent to proceed: Yes. He has not had a related appointment within my department in the past 7 days or scheduled within the next 24 hours.       Total Time: minutes: 5-10 minutes    Bharath Mcdermott MD

## 2021-03-02 ENCOUNTER — TELEPHONE (OUTPATIENT)
Dept: INTERNAL MEDICINE CLINIC | Age: 71
End: 2021-03-02

## 2021-03-02 RX ORDER — LOSARTAN POTASSIUM AND HYDROCHLOROTHIAZIDE 25; 100 MG/1; MG/1
1 TABLET ORAL DAILY
Qty: 90 TAB | Refills: 3 | Status: SHIPPED | OUTPATIENT
Start: 2021-03-02 | End: 2021-10-07

## 2021-03-02 NOTE — TELEPHONE ENCOUNTER
Today our office received a notice from [facility] requesting a refill. [facility] asking to combine losartan/hydrochlorothiazide 100/25mg.

## 2021-05-21 DIAGNOSIS — E11.41 DIABETIC MONONEUROPATHY ASSOCIATED WITH TYPE 2 DIABETES MELLITUS (HCC): ICD-10-CM

## 2021-05-21 NOTE — TELEPHONE ENCOUNTER
Medication Refill     Caller (if not patient): Jackie Smiley       Relationship of caller (if not patient): spouse       Best contact number(s): [phone]       Name of medication and dosage if known:  gabapentin (NEURONTIN) 600 mg tablet         Is patient out of this medication (yes/no): no       Pharmacy name: [address] [place] [address] [place] listed in chart? (yes/no): yes   Pharmacy phone number: [phone]         Details to clarify the request: N/A       Message from [facility]

## 2021-05-28 RX ORDER — GABAPENTIN 600 MG/1
TABLET ORAL
Qty: 540 TABLET | Refills: 2 | Status: SHIPPED | OUTPATIENT
Start: 2021-05-28 | End: 2021-11-23

## 2021-10-06 ENCOUNTER — TELEPHONE (OUTPATIENT)
Dept: INTERNAL MEDICINE CLINIC | Age: 71
End: 2021-10-06

## 2021-10-07 RX ORDER — TELMISARTAN AND HYDROCHLORTHIAZIDE 80; 25 MG/1; MG/1
1 TABLET ORAL DAILY
Qty: 90 TABLET | Refills: 3 | Status: SHIPPED | OUTPATIENT
Start: 2021-10-07 | End: 2022-08-30

## 2021-10-25 RX ORDER — METOPROLOL TARTRATE 25 MG/1
TABLET, FILM COATED ORAL
Qty: 180 TABLET | Refills: 3 | Status: SHIPPED | OUTPATIENT
Start: 2021-10-25 | End: 2022-10-19

## 2021-10-27 RX ORDER — PEN NEEDLE, DIABETIC 29 G X1/2"
NEEDLE, DISPOSABLE MISCELLANEOUS
Qty: 100 PEN NEEDLE | Refills: 11 | Status: SHIPPED | OUTPATIENT
Start: 2021-10-27

## 2021-11-23 DIAGNOSIS — E11.41 DIABETIC MONONEUROPATHY ASSOCIATED WITH TYPE 2 DIABETES MELLITUS (HCC): ICD-10-CM

## 2021-11-23 RX ORDER — GABAPENTIN 600 MG/1
TABLET ORAL
Qty: 540 TABLET | Refills: 2 | Status: SHIPPED | OUTPATIENT
Start: 2021-11-23 | End: 2022-05-23 | Stop reason: SDUPTHER

## 2022-01-24 RX ORDER — METFORMIN HYDROCHLORIDE 500 MG/1
TABLET ORAL
Qty: 180 TABLET | Refills: 3 | Status: SHIPPED | OUTPATIENT
Start: 2022-01-24

## 2022-03-06 NOTE — TELEPHONE ENCOUNTER
#[phone] pt's wife, Zara Gentile states that pt is on Losartan and she has been reading this is not good for him. Is there something else for b/p that Dr. Reshma Heck can prescribe? Please call to let them know.   Thanks
Identified patient 2 identifiers verified. Patient received a message that Losartan was taken off the market , received a letter from the pharmacy, lot number was effected. Wanted to know if something can be prescribed?
Ok, Prescription done. Let patient know. Orders Placed This Encounter    telmisartan-hydroCHLOROthiazide (MICARDIS HCT) 80-25 mg per tablet     Sig: Take 1 Tablet by mouth daily.      Dispense:  90 Tablet     Refill:  3
Patient aware of new prescription for [facility]ardis that [facility].
show

## 2022-03-09 RX ORDER — INSULIN DETEMIR 100 [IU]/ML
45 INJECTION, SOLUTION SUBCUTANEOUS
Qty: 42 ML | Refills: 0 | Status: SHIPPED | OUTPATIENT
Start: 2022-03-09 | End: 2022-08-19

## 2022-03-18 PROBLEM — E11.42 DIABETIC POLYNEUROPATHY ASSOCIATED WITH TYPE 2 DIABETES MELLITUS (HCC): Status: ACTIVE | Noted: 2018-07-25

## 2022-04-19 ENCOUNTER — TELEPHONE (OUTPATIENT)
Dept: INTERNAL MEDICINE CLINIC | Age: 72
End: 2022-04-19

## 2022-04-19 RX ORDER — EZETIMIBE 10 MG/1
10 TABLET ORAL DAILY
Qty: 90 TABLET | Refills: 3 | Status: SHIPPED | OUTPATIENT
Start: 2022-04-19

## 2022-04-19 NOTE — TELEPHONE ENCOUNTER
----- Message from Marissa Cloud sent at 4/19/2022  9:11 AM EDT -----  Subject: Message to Provider    QUESTIONS  Information for Provider? Patient want to know [facility]   called in mediation for ezetimibe  ---------------------------------------------------------------------------  --------------  CALL BACK INFO  What is the best way for the office to contact you? OK to leave message on   voicemail  Preferred Call Back Phone Number? [phone]  ---------------------------------------------------------------------------  --------------  SCRIPT ANSWERS  Relationship to Patient?  Self

## 2022-04-26 ENCOUNTER — PATIENT MESSAGE (OUTPATIENT)
Dept: CASE MANAGEMENT | Age: 72
End: 2022-04-26

## 2022-04-26 DIAGNOSIS — E11.42 DIABETIC POLYNEUROPATHY ASSOCIATED WITH TYPE 2 DIABETES MELLITUS (HCC): ICD-10-CM

## 2022-04-26 DIAGNOSIS — E11.41 DIABETIC MONONEUROPATHY ASSOCIATED WITH TYPE 2 DIABETES MELLITUS (HCC): ICD-10-CM

## 2022-04-26 DIAGNOSIS — Z79.4 TYPE 2 DIABETES MELLITUS WITH DIABETIC POLYNEUROPATHY, WITH LONG-TERM CURRENT USE OF INSULIN (HCC): ICD-10-CM

## 2022-04-26 DIAGNOSIS — E11.42 TYPE 2 DIABETES MELLITUS WITH DIABETIC POLYNEUROPATHY, WITH LONG-TERM CURRENT USE OF INSULIN (HCC): ICD-10-CM

## 2022-05-23 RX ORDER — GABAPENTIN 600 MG/1
TABLET ORAL
Qty: 540 TABLET | Refills: 1 | Status: SHIPPED | OUTPATIENT
Start: 2022-05-23 | End: 2022-05-25 | Stop reason: SDUPTHER

## 2022-05-25 DIAGNOSIS — E11.41 DIABETIC MONONEUROPATHY ASSOCIATED WITH TYPE 2 DIABETES MELLITUS (HCC): ICD-10-CM

## 2022-05-26 ENCOUNTER — OFFICE VISIT (OUTPATIENT)
Dept: INTERNAL MEDICINE CLINIC | Age: 72
End: 2022-05-26
Payer: MEDICARE

## 2022-05-26 VITALS
HEIGHT: 66 IN | RESPIRATION RATE: 16 BRPM | SYSTOLIC BLOOD PRESSURE: 119 MMHG | TEMPERATURE: 97.6 F | HEART RATE: 57 BPM | OXYGEN SATURATION: 98 % | BODY MASS INDEX: 32.62 KG/M2 | DIASTOLIC BLOOD PRESSURE: 73 MMHG | WEIGHT: 203 LBS

## 2022-05-26 DIAGNOSIS — Z00.00 MEDICARE ANNUAL WELLNESS VISIT, SUBSEQUENT: Primary | ICD-10-CM

## 2022-05-26 DIAGNOSIS — E11.42 TYPE 2 DIABETES MELLITUS WITH DIABETIC POLYNEUROPATHY, WITH LONG-TERM CURRENT USE OF INSULIN (HCC): ICD-10-CM

## 2022-05-26 DIAGNOSIS — Z79.4 TYPE 2 DIABETES MELLITUS WITH DIABETIC POLYNEUROPATHY, WITH LONG-TERM CURRENT USE OF INSULIN (HCC): ICD-10-CM

## 2022-05-26 DIAGNOSIS — E11.41 DIABETIC MONONEUROPATHY ASSOCIATED WITH TYPE 2 DIABETES MELLITUS (HCC): ICD-10-CM

## 2022-05-26 DIAGNOSIS — I10 ESSENTIAL HYPERTENSION: ICD-10-CM

## 2022-05-26 LAB
ALBUMIN SERPL-MCNC: 3.8 G/DL (ref 3.5–5)
ALBUMIN/GLOB SERPL: 1.2 {RATIO} (ref 1.1–2.2)
ALP SERPL-CCNC: 60 U/L (ref 45–117)
ALT SERPL-CCNC: 23 U/L (ref 12–78)
ANION GAP SERPL CALC-SCNC: 4 MMOL/L (ref 5–15)
AST SERPL-CCNC: 14 U/L (ref 15–37)
BASOPHILS # BLD: 0.1 K/UL (ref 0–0.1)
BASOPHILS NFR BLD: 1 % (ref 0–1)
BILIRUB SERPL-MCNC: 0.7 MG/DL (ref 0.2–1)
BUN SERPL-MCNC: 24 MG/DL (ref 6–20)
BUN/CREAT SERPL: 19 (ref 12–20)
CALCIUM SERPL-MCNC: 9 MG/DL (ref 8.5–10.1)
CHLORIDE SERPL-SCNC: 106 MMOL/L (ref 97–108)
CHOLEST SERPL-MCNC: 152 MG/DL
CO2 SERPL-SCNC: 28 MMOL/L (ref 21–32)
CREAT SERPL-MCNC: 1.26 MG/DL (ref 0.7–1.3)
CREAT UR-MCNC: 56.8 MG/DL
DIFFERENTIAL METHOD BLD: NORMAL
EOSINOPHIL # BLD: 0.2 K/UL (ref 0–0.4)
EOSINOPHIL NFR BLD: 3 % (ref 0–7)
ERYTHROCYTE [DISTWIDTH] IN BLOOD BY AUTOMATED COUNT: 13 % (ref 11.5–14.5)
EST. AVERAGE GLUCOSE BLD GHB EST-MCNC: 203 MG/DL
GLOBULIN SER CALC-MCNC: 3.3 G/DL (ref 2–4)
GLUCOSE SERPL-MCNC: 101 MG/DL (ref 65–100)
HBA1C MFR BLD: 8.7 % (ref 4–5.6)
HCT VFR BLD AUTO: 49.9 % (ref 36.6–50.3)
HDLC SERPL-MCNC: 39 MG/DL
HDLC SERPL: 3.9 {RATIO} (ref 0–5)
HGB BLD-MCNC: 16.3 G/DL (ref 12.1–17)
IMM GRANULOCYTES # BLD AUTO: 0 K/UL (ref 0–0.04)
IMM GRANULOCYTES NFR BLD AUTO: 0 % (ref 0–0.5)
LDLC SERPL CALC-MCNC: 91.8 MG/DL (ref 0–100)
LYMPHOCYTES # BLD: 2 K/UL (ref 0.8–3.5)
LYMPHOCYTES NFR BLD: 36 % (ref 12–49)
MCH RBC QN AUTO: 31.5 PG (ref 26–34)
MCHC RBC AUTO-ENTMCNC: 32.7 G/DL (ref 30–36.5)
MCV RBC AUTO: 96.5 FL (ref 80–99)
MICROALBUMIN UR-MCNC: 1.84 MG/DL
MICROALBUMIN/CREAT UR-RTO: 32 MG/G (ref 0–30)
MONOCYTES # BLD: 0.5 K/UL (ref 0–1)
MONOCYTES NFR BLD: 9 % (ref 5–13)
NEUTS SEG # BLD: 2.9 K/UL (ref 1.8–8)
NEUTS SEG NFR BLD: 51 % (ref 32–75)
NRBC # BLD: 0 K/UL (ref 0–0.01)
NRBC BLD-RTO: 0 PER 100 WBC
PLATELET # BLD AUTO: 208 K/UL (ref 150–400)
PMV BLD AUTO: 11.1 FL (ref 8.9–12.9)
POTASSIUM SERPL-SCNC: 4.4 MMOL/L (ref 3.5–5.1)
PROT SERPL-MCNC: 7.1 G/DL (ref 6.4–8.2)
RBC # BLD AUTO: 5.17 M/UL (ref 4.1–5.7)
SODIUM SERPL-SCNC: 138 MMOL/L (ref 136–145)
TRIGL SERPL-MCNC: 106 MG/DL (ref ?–150)
VLDLC SERPL CALC-MCNC: 21.2 MG/DL
WBC # BLD AUTO: 5.7 K/UL (ref 4.1–11.1)

## 2022-05-26 PROCEDURE — G8510 SCR DEP NEG, NO PLAN REQD: HCPCS | Performed by: INTERNAL MEDICINE

## 2022-05-26 PROCEDURE — G8754 DIAS BP LESS 90: HCPCS | Performed by: INTERNAL MEDICINE

## 2022-05-26 PROCEDURE — G0439 PPPS, SUBSEQ VISIT: HCPCS | Performed by: INTERNAL MEDICINE

## 2022-05-26 PROCEDURE — G8427 DOCREV CUR MEDS BY ELIG CLIN: HCPCS | Performed by: INTERNAL MEDICINE

## 2022-05-26 PROCEDURE — G8417 CALC BMI ABV UP PARAM F/U: HCPCS | Performed by: INTERNAL MEDICINE

## 2022-05-26 PROCEDURE — 3017F COLORECTAL CA SCREEN DOC REV: CPT | Performed by: INTERNAL MEDICINE

## 2022-05-26 PROCEDURE — 1101F PT FALLS ASSESS-DOCD LE1/YR: CPT | Performed by: INTERNAL MEDICINE

## 2022-05-26 PROCEDURE — G8752 SYS BP LESS 140: HCPCS | Performed by: INTERNAL MEDICINE

## 2022-05-26 PROCEDURE — 2022F DILAT RTA XM EVC RTNOPTHY: CPT | Performed by: INTERNAL MEDICINE

## 2022-05-26 PROCEDURE — G8536 NO DOC ELDER MAL SCRN: HCPCS | Performed by: INTERNAL MEDICINE

## 2022-05-26 PROCEDURE — 3046F HEMOGLOBIN A1C LEVEL >9.0%: CPT | Performed by: INTERNAL MEDICINE

## 2022-05-26 NOTE — PATIENT INSTRUCTIONS
Medicare Wellness Visit, Male    The best way to live healthy is to have a lifestyle where you eat a well-balanced diet, exercise regularly, limit alcohol use, and quit all forms of tobacco/nicotine, if applicable. Regular preventive services are another way to keep healthy. Preventive services (vaccines, screening tests, monitoring & exams) can help personalize your care plan, which helps you manage your own care. Screening tests can find health problems at the earliest stages, when they are easiest to treat. [facility] follows the current, evidence-based guidelines published by the [facility] ([facility]) when recommending preventive services for our patients. Because we follow these guidelines, sometimes recommendations change over time as research supports it. (For example, a prostate screening blood test is no longer routinely recommended for men with no symptoms). Of course, you and your doctor may decide to screen more often for some diseases, based on your risk and co-morbidities (chronic disease you are already diagnosed with). Preventive services for you include:  - Medicare offers their members a free annual wellness visit, which is time for you and your primary care provider to discuss and plan for your preventive service needs. Take advantage of this benefit every year!  -All adults over age 72 should receive the recommended pneumonia vaccines. Current USPSTF guidelines recommend a series of two vaccines for the best pneumonia protection.   -All adults should have a flu vaccine yearly and tetanus vaccine every 10 years.  -All adults age 48 and older should receive the shingles vaccines (series of two vaccines).        -All adults age 38-68 who are overweight should have a diabetes screening test once every three years.   -Other screening tests & preventive services for persons with diabetes include: an eye exam to screen for diabetic retinopathy, a kidney function test, a foot exam, and stricter control over your cholesterol.   -Cardiovascular screening for adults with routine risk involves an electrocardiogram (ECG) at intervals determined by the provider.   -Colorectal cancer screening should be done for adults age 54-65 with no increased risk factors for colorectal cancer. There are a number of acceptable methods of screening for this type of cancer. Each test has its own benefits and drawbacks. Discuss with your provider what is most appropriate for you during your annual wellness visit. The different tests include: colonoscopy (considered the best screening method), a fecal occult blood test, a fecal DNA test, and sigmoidoscopy.  -All adults born between [facility] should be screened once for Hepatitis C.  -An Abdominal Aortic Aneurysm (AAA) Screening is recommended for men age 73-68 who has ever smoked in their lifetime.      Here is a list of your current Health Maintenance items (your personalized list of preventive services) with a due date:  Health Maintenance Due   Topic Date Due    COVID-19 Vaccine (1) Never done    Shingles Vaccine (1 of 2) Never done    Pneumococcal Vaccine (3 - PPSV23 or PCV20) 07/16/2018    Diabetic Foot Care  04/13/2019    Hemoglobin A1C    02/25/2021    Albumin Urine Test  02/25/2021    Cholesterol Test   02/25/2021    Eye Exam  03/07/2021    Depresssion Screening  08/25/2021

## 2022-05-26 NOTE — PROGRESS NOTES
SUBJECTIVE:   Mr. Rico Finney is a 67 y.o. male who is here for follow up of routine medical issues. Previously saw Dr. Brian Mcfarland. Chief Complaint   Patient presents with    Physical     due for eye exam and foot exam     Medication Evaluation     stated his meds are all the same except insulin , unsure on recall of  names, but his wife does them for him        Has been seeing Dr. Zahra Rodriguez for what sounds like retinopathy; getting \"that needle in the eye. \"  Due for follow up, but he has put this off due to April. Glc at home runs 130s - 140s. No longer seeing Dr. Gal Worrell. He sees Dr. Cornelia Del Real for CAD. Denies CP. He has neuralgias in feet, controlled by gabapentin. Dr. Adolph Shaikh did EMG/NCV in Jan 2014. Last colonoscopy more than 10 years ago. At this time, he is otherwise doing well and has brought no other complaints to my attention today. For a list of the medical issues addressed today, see the assessment and plan below. PMH:   Past Medical History:   Diagnosis Date    CAD (coronary artery disease)     Diabetes ([address]     Heart disease     Hypertension        Past Surgical History:   Procedure Laterality Date    HX HEMORRHOIDECTOMY      [place] CARDIAC SURG PROCEDURE UNLIST         All: He has No Known Allergies. Current Outpatient Medications   Medication Sig    gabapentin (NEURONTIN) 600 mg tablet TAKE 2 TABLETS THREE TIMES A DAY    ezetimibe (ZETIA) 10 mg tablet Take 1 Tablet by mouth daily.  metFORMIN (GLUCOPHAGE) 500 mg tablet TAKE 1 TABLET TWICE A DAY WITH MEALS    Insulin Needles, Disposable, (Unifine Pentips) 31 gauge x 1/4\" ndle USE AS DIRECTED    metoprolol tartrate (LOPRESSOR) 25 mg tablet TAKE 1 TABLET TWICE A DAY    telmisartan-hydroCHLOROthiazide (MICARDIS HCT) 80-25 mg per tablet Take 1 Tablet by mouth daily.     flash glucose scanning reader (FREESTYLE LOCO READER) misc Check Blood Glucose Three Times Daily Dx Code:E11.41, E11.42    flash glucose sensor (FREESTYLE LOCO SENSOR) kit Check Blood Glucose Three Times Daily Dx Code:E11.41, E11.42    traZODone (DESYREL) 50 mg tablet Take 1 Tab by mouth nightly.  simvastatin (ZOCOR) 10 mg tablet Take  by mouth nightly.  Diabetic Shoes XX Patient has diabetic foot neuropathy. He needs diabetic shoes to help with his diagnosis. Diabetic neuropathy: Code 250.60    Lancets (MICROLET LANCET) misc Test TID    aspirin 81 mg chewable tablet Take 1 Tab by mouth daily.  clopidogrel (PLAVIX) 75 mg tablet Take 1 Tab by mouth daily.  insulin detemir U-100 (Levemir FlexTouch U-100 Insuln) 100 unit/mL (3 mL) inpn 45 Units by SubCUTAneous route nightly. INJECT 45 UNITS UNDER THE SKIN NIGHTLY    pneumococcal 13 noman conj dip (PREVNAR 13, PF,) 0.5 mL syrg injection 0.5 mL by IntraMUSCular route PRIOR TO DISCHARGE for 1 dose. (Patient not taking: Reported on 2022)    pravastatin (PRAVACHOL) 40 mg tablet Take 40 mg by mouth nightly. Indications: HYPERCHOLESTEROLEMIA (Patient not taking: Reported on 2022)     No current facility-administered medications for this visit. FH: His family history includes Heart Disease in his paternal uncle; and Hypertension in his paternal uncle. Mother  of cancer of some type. Father is living. His brother had prostate cancer. SH: . Retired from [employer], and from [employer], and from [email or web address]. He reports that he has never smoked. He has never used smokeless tobacco. He reports that he does not drink alcohol and does not use drugs. ROS: See above; Complete ROS otherwise negative. OBJECTIVE:   Vitals:   Visit Vitals  /73 (BP 1 Location: Left arm, BP Patient Position: Sitting, BP Cuff Size: Adult)   Pulse (!) 57   Temp 97.6 °F (36.4 °C) (Temporal)   Resp 16   Ht 5' 6\" (1.676 m)   Wt 203 lb (92.1 kg)   SpO2 98%   BMI 32.77 kg/m²      Gen: Pleasant 67 y.o.  male in NAD.       Lab Results   Component Value Date/Time    Sodium 137 2020 11:51 AM Potassium 4.5 02/25/2020 11:51 AM    Chloride 101 02/25/2020 11:51 AM    CO2 20 02/25/2020 11:51 AM    Anion gap 12 07/17/2013 03:25 AM    Glucose 251 (H) 02/25/2020 11:51 AM    BUN 20 02/25/2020 11:51 AM    Creatinine 1.11 02/25/2020 11:51 AM    BUN/Creatinine ratio 18 02/25/2020 11:51 AM    GFR est AA 77 02/25/2020 11:51 AM    GFR est non-AA 67 02/25/2020 11:51 AM    Calcium 9.3 02/25/2020 11:51 AM    Bilirubin, total 0.7 02/25/2020 11:51 AM    ALT (SGPT) 16 02/25/2020 11:51 AM    Alk. phosphatase 76 02/25/2020 11:51 AM    Protein, total 6.2 02/25/2020 11:51 AM    Albumin 4.3 02/25/2020 11:51 AM    Globulin 3.1 07/14/2013 09:50 AM    A-G Ratio 2.3 (H) 02/25/2020 11:51 AM       Lab Results   Component Value Date/Time    Cholesterol, total 171 02/25/2020 11:51 AM    HDL Cholesterol 42 02/25/2020 11:51 AM    LDL, calculated 104 (H) 02/25/2020 11:51 AM    Triglyceride 126 02/25/2020 11:51 AM    CHOL/HDL Ratio 5.1 (H) 07/15/2013 09:10 AM        Lab Results   Component Value Date/Time    Hemoglobin A1c 11.9 (H) 02/25/2020 11:51 AM       Lab Results   Component Value Date/Time    WBC 6.9 02/25/2020 11:51 AM    HGB 16.3 02/25/2020 11:51 AM    HCT 48.5 02/25/2020 11:51 AM    PLATELET 939 72/03/7286 11:51 AM    MCV 95 02/25/2020 11:51 AM         ASSESSMENT/ PLAN: Elo Torres was seen today for follow up. DM (diabetes mellitus): Not controlled at last check. Continue insulin. Had been seeing Dr. Gentry Bose with Endocrinology--on hold. Check labs. Insomnia: Improved. trazodone. Anxiety: Bedtime trazodone. Consider other agents down the road. HTN (hypertension): Excellent BP today. Hypercholesteremia  - LIPID PANEL    Coronary artery disease: No CP today. F/U with Cardiology. Diabetic neuropathy: Ongoing, stable. Gapabentin helps. - gabapentin (NEURONTIN) 600 mg tablet; Take 2 tablets by mouth three (3) times daily.     Diabetic retinopathy: Seeing ophthalmologist.     Colon cancer screening: Previously have referred to GI. He is disinclined to follow through    RTC 4-6 months DM    I have reviewed the patient's medications and risks/side effects/benefits were discussed. Diagnosis(-es) explained to patient and questions answered. Literature provided where appropriate.

## 2022-05-26 NOTE — PROGRESS NOTES
.1. Have you been to the ER, urgent care clinic since your last visit? Hospitalized since your last visit? No    2. Have you seen or consulted any other health care providers outside of the [address] [place] [address] since your last visit? Include any pap smears or colon screening.  No        Em lpn

## 2022-05-26 NOTE — PROGRESS NOTES
This is the Subsequent Medicare Annual Wellness Exam, performed 12 months or more after the Initial AWV or the last Subsequent AWV    I have reviewed the patient's medical history in detail and updated the computerized patient record. Depression Risk Factor Screening:     3 most recent PHQ Screens 5/26/2022   Little interest or pleasure in doing things Not at all   Feeling down, depressed, irritable, or hopeless Not at all   Total Score PHQ 2 0     Confirmed answers today. Alcohol Risk Screen    Do you average more than 1 drink per night or more than 7 drinks a week: No    In the past three months have you have had more than 4 drinks containing alcohol on one occasion: No        Functional Ability and Level of Safety:    Hearing: Hearing is good. Activities of Daily Living: The home contains: no safety equipment. Patient does total self care      Ambulation: with no difficulty     Fall Risk:  Fall Risk Assessment, last 12 mths 5/26/2022   Able to walk? Yes   Fall in past 12 months? 0   Do you feel unsteady?  0   Are you worried about falling 0      Abuse Screen:  Patient is not abused       Cognitive Screening    Has your family/caregiver stated any concerns about your memory: no    Cognitive Screening: Normal    Assessment/Plan   Education and counseling provided:  Are appropriate based on today's review and evaluation        Health Maintenance Due     Health Maintenance Due   Topic Date Due    COVID-19 Vaccine (1) Never done    Shingrix Vaccine Age 50> (1 of 2) Never done    Pneumococcal 65+ years (3 - PPSV23 or PCV20) 07/16/2018    Foot Exam Q1  04/13/2019    A1C test (Diabetic or Prediabetic)  02/25/2021    MICROALBUMIN Q1  02/25/2021    Lipid Screen  02/25/2021    Eye Exam Retinal or Dilated  03/07/2021    Depression Screen  08/25/2021    Medicare Yearly Exam  02/25/2022       Patient Care Team   Patient Care Team:  Shelbie Gamble MD as PCP - General (Internal Medicine Physician)  Ewelina Hernandez MD as PCP - [facility]  Champ Bah MD (Cardiovascular Disease Physician)    History     Patient Active Problem List   Diagnosis Code    DM (diabetes mellitus) ([facility] E11.9    Hypercholesteremia E78.00    [facility] E11.40    Coronary artery disease I25.10    H/O heart artery stent Z95.5    Essential hypertension I10    Diabetic polyneuropathy associated with type 2 diabetes mellitus ([facility] E11.42     Past Medical History:   Diagnosis Date    CAD (coronary artery disease)     [facility]     Heart disease     Hypertension       Past Surgical History:   Procedure Laterality Date    HX HEMORRHOIDECTOMY      [place] CARDIAC SURG PROCEDURE UNLIST       Current Outpatient Medications   Medication Sig Dispense Refill    gabapentin (NEURONTIN) 600 mg tablet TAKE 2 TABLETS THREE TIMES A  Tablet 1    ezetimibe (ZETIA) 10 mg tablet Take 1 Tablet by mouth daily. 90 Tablet 3    metFORMIN (GLUCOPHAGE) 500 mg tablet TAKE 1 TABLET TWICE A DAY WITH MEALS 180 Tablet 3    Insulin Needles, Disposable, (Unifine Pentips) 31 gauge x 1/4\" ndle USE AS DIRECTED 100 Pen Needle 11    metoprolol tartrate (LOPRESSOR) 25 mg tablet TAKE 1 TABLET TWICE A  Tablet 3    telmisartan-hydroCHLOROthiazide (MICARDIS HCT) 80-25 mg per tablet Take 1 Tablet by mouth daily. 90 Tablet 3    flash glucose scanning reader (FREESTYLE LOCO READER) misc Check Blood Glucose Three Times Daily Dx Code:E11.41, E11.42 1 Device 0    flash glucose sensor (FREESTYLE LOCO SENSOR) kit Check Blood Glucose Three Times Daily Dx Code:E11.41, E11.42 3 Box 3    traZODone (DESYREL) 50 mg tablet Take 1 Tab by mouth nightly. 90 Tab 3    simvastatin (ZOCOR) 10 mg tablet Take  by mouth nightly.  Diabetic Shoes XX Patient has diabetic foot neuropathy. He needs diabetic shoes to help with his diagnosis.       Diabetic neuropathy: Code 250.60 1 Device 0    Lancets (MICROLET LANCET) misc Test  Each 11    aspirin 81 mg chewable tablet Take 1 Tab by mouth daily. 30 Tab 11    clopidogrel (PLAVIX) 75 mg tablet Take 1 Tab by mouth daily. 30 Tab 11    insulin detemir U-100 (Levemir FlexTouch U-100 Insuln) 100 unit/mL (3 mL) inpn 45 Units by SubCUTAneous route nightly. INJECT 45 UNITS UNDER THE SKIN NIGHTLY 42 mL 0    pneumococcal 13 noman conj dip (PREVNAR 13, PF,) 0.5 mL syrg injection 0.5 mL by IntraMUSCular route PRIOR TO DISCHARGE for 1 dose. (Patient not taking: Reported on 5/26/2022) 1 Syringe 0    pravastatin (PRAVACHOL) 40 mg tablet Take 40 mg by mouth nightly.  Indications: HYPERCHOLESTEROLEMIA (Patient not taking: Reported on 5/26/2022)       No Known Allergies    Family History   Problem Relation Age of Onset    Cancer Mother         ovarian    Cancer Brother         Prostate    Hypertension Paternal Uncle     Heart Disease Paternal Uncle     Alcohol abuse Father     No Known Problems Brother     No Known Problems Brother     No Known Problems Brother      Social History     Tobacco Use    Smoking status: Never Smoker    Smokeless tobacco: Never Used    Tobacco comment: occ   Substance Use Topics    Alcohol use: No     Comment: ETOH free for 2 years

## 2022-05-27 RX ORDER — GABAPENTIN 600 MG/1
TABLET ORAL
Qty: 540 TABLET | Refills: 1 | Status: SHIPPED | OUTPATIENT
Start: 2022-05-27

## 2022-06-08 ENCOUNTER — TELEPHONE (OUTPATIENT)
Dept: INTERNAL MEDICINE CLINIC | Age: 72
End: 2022-06-08

## 2022-06-13 ENCOUNTER — TELEPHONE (OUTPATIENT)
Dept: INTERNAL MEDICINE CLINIC | Age: 72
End: 2022-06-13

## 2022-06-13 DIAGNOSIS — T14.8XXA BLOOD BLISTER: Primary | ICD-10-CM

## 2022-06-13 NOTE — TELEPHONE ENCOUNTER
Spoke to [employer] at 's office and she requested we fax referral with [facility] and she will call patient to schedule.

## 2022-06-13 NOTE — TELEPHONE ENCOUNTER
Spoke w/ pt, pt does not want an appt. Pt wants to have a referral for either his foot or for his diabetes. Pt does not want to have to go to multiple places/appts if he is going to be referred to someone else. Pt was last seen on 5/26/22. Please advise.

## 2022-06-13 NOTE — TELEPHONE ENCOUNTER
----- Message from Kiah Fernando sent at 6/13/2022  9:02 AM EDT -----  Subject: Appointment Request    Reason for Call: Urgent (Patient Request) No Script    QUESTIONS  Type of Appointment? Established Patient  Reason for appointment request? No appointments available during search  Additional Information for Provider? pt wanted an appt to see Dr. Nathalie Caraballo   as soon as possible. but no appt till 6/16/2022. pt is a types 2 diabetic   and pt has a blood blister on his left big toe that started 4 days ago. pt   wants to know if he can get a referral to someone or get in sooner. ---------------------------------------------------------------------------  --------------  Dania SLAUGHTER  What is the best way for the office to contact you? OK to leave message on   voicemail  Preferred Call Back Phone Number? [phone]  ---------------------------------------------------------------------------  --------------  SCRIPT ANSWERS  Relationship to Patient? Self  (Is the patient requesting to see the provider for a procedure?)? No  (Is the patient requesting to see the provider urgently  today or   tomorrow. )? Yes  Have you been diagnosed with COVID-19 in the past 10 days? No  (Service Expert  click yes below to proceed with [facility]ual   Scheduling)?  Yes

## 2022-08-19 RX ORDER — INSULIN DETEMIR 100 [IU]/ML
INJECTION, SOLUTION SUBCUTANEOUS
Qty: 30 ML | Refills: 4 | Status: SHIPPED | OUTPATIENT
Start: 2022-08-19

## 2022-08-30 RX ORDER — TELMISARTAN AND HYDROCHLOROTHIAZIDE 80; 25 MG/1; MG/1
TABLET ORAL
Qty: 90 TABLET | Refills: 3 | Status: SHIPPED | OUTPATIENT
Start: 2022-08-30

## 2022-10-10 ENCOUNTER — CLINICAL SUPPORT (OUTPATIENT)
Dept: INTERNAL MEDICINE CLINIC | Age: 72
End: 2022-10-10
Payer: MEDICARE

## 2022-10-10 VITALS
RESPIRATION RATE: 18 BRPM | HEART RATE: 70 BPM | BODY MASS INDEX: 31.98 KG/M2 | DIASTOLIC BLOOD PRESSURE: 70 MMHG | TEMPERATURE: 98.1 F | WEIGHT: 199 LBS | OXYGEN SATURATION: 98 % | SYSTOLIC BLOOD PRESSURE: 118 MMHG | HEIGHT: 66 IN

## 2022-10-10 DIAGNOSIS — Z23 NEEDS FLU SHOT: Primary | ICD-10-CM

## 2022-10-10 PROCEDURE — 90694 VACC AIIV4 NO PRSRV 0.5ML IM: CPT | Performed by: INTERNAL MEDICINE

## 2022-10-10 NOTE — PROGRESS NOTES
After obtaining consent, and per verbal order from Dr. Helder Lima, patient received influenza vaccine given by Matt Aviles in LEFT Deltoid. Influenza Vaccine 0.5 mL IM now. Patient was observed for 10 minutes post injection. Patient tolerated injection well.

## 2022-10-19 RX ORDER — METOPROLOL TARTRATE 25 MG/1
TABLET, FILM COATED ORAL
Qty: 180 TABLET | Refills: 3 | Status: SHIPPED | OUTPATIENT
Start: 2022-10-19

## 2022-11-22 RX ORDER — PEN NEEDLE, DIABETIC 29 G X1/2"
NEEDLE, DISPOSABLE MISCELLANEOUS
Qty: 100 PEN NEEDLE | Refills: 11 | Status: SHIPPED | OUTPATIENT
Start: 2022-11-22

## 2022-11-23 DIAGNOSIS — E11.41 DIABETIC MONONEUROPATHY ASSOCIATED WITH TYPE 2 DIABETES MELLITUS (HCC): ICD-10-CM

## 2022-11-23 NOTE — TELEPHONE ENCOUNTER
PCP: Taina Murillo MD    Last appt: 10/10/2022  No future appointments.     Requested Prescriptions     Pending Prescriptions Disp Refills    gabapentin (NEURONTIN) 600 mg tablet 540 Tablet 1     Sig: TAKE 2 TABLETS THREE TIMES A DAY

## 2022-11-24 RX ORDER — GABAPENTIN 600 MG/1
TABLET ORAL
Qty: 540 TABLET | Refills: 1 | Status: SHIPPED | OUTPATIENT
Start: 2022-11-24

## 2023-01-17 RX ORDER — METFORMIN HYDROCHLORIDE 500 MG/1
TABLET ORAL
Qty: 180 TABLET | Refills: 3 | Status: SHIPPED | OUTPATIENT
Start: 2023-01-17

## 2023-03-27 DIAGNOSIS — E11.41 DIABETIC MONONEUROPATHY ASSOCIATED WITH TYPE 2 DIABETES MELLITUS (HCC): ICD-10-CM

## 2023-03-27 RX ORDER — EZETIMIBE 10 MG/1
TABLET ORAL
Qty: 90 TABLET | Refills: 3 | Status: SHIPPED | OUTPATIENT
Start: 2023-03-27

## 2023-03-27 NOTE — TELEPHONE ENCOUNTER
Pt will be out of med tomorrow, 3-28-23 and waiting on mail order. Please send emergency supply to [facility] of file.

## 2023-03-28 RX ORDER — GABAPENTIN 600 MG/1
TABLET ORAL
Qty: 540 TABLET | Refills: 1 | Status: SHIPPED | OUTPATIENT
Start: 2023-03-28

## 2023-03-28 RX ORDER — GABAPENTIN 600 MG/1
TABLET ORAL
Refills: 0 | Status: CANCELLED | OUTPATIENT
Start: 2023-03-28

## 2023-05-16 NOTE — TELEPHONE ENCOUNTER
Future Appointments:  No future appointments.      Last Appointment With Me:  5/26/2022     Requested Prescriptions     Pending Prescriptions Disp Refills    insulin detemir (LEVEMIR FLEXTOUCH) 100 UNIT/ML injection pen 5 Adjustable Dose Pre-filled Pen Syringe

## 2023-06-26 DIAGNOSIS — E11.42 DIABETIC POLYNEUROPATHY ASSOCIATED WITH TYPE 2 DIABETES MELLITUS (HCC): Primary | ICD-10-CM

## 2023-06-26 RX ORDER — GABAPENTIN 600 MG/1
TABLET ORAL
Qty: 180 TABLET | Refills: 5 | Status: SHIPPED | OUTPATIENT
Start: 2023-06-26 | End: 2023-12-26

## 2023-07-07 DIAGNOSIS — E11.42 DIABETIC POLYNEUROPATHY ASSOCIATED WITH TYPE 2 DIABETES MELLITUS (HCC): ICD-10-CM

## 2023-07-07 NOTE — TELEPHONE ENCOUNTER
Patient states he needs a call back to discuss he needs 90 day supply done on his Gabapentin & only 30 day supply done. Patient was advised Dr. Nahomi Casarez is on Vacation & Correction may have to be done when he returns Next Week. Please call to advise.  Thank you

## 2023-07-07 NOTE — TELEPHONE ENCOUNTER
Patient states he needs a call back in reference to his Gabapentin Prescription was sent in incorrectly. Patient states Mail Order only sent a 30 day supply & they advised prescription sent was on a 30 day supply & patient needs a 90 day supply to be dispensed as written to be 2 pills 3 x day that would be 6 a day x 90 =540 Pills. Please call to discuss & advise on correction.  Thank you

## 2023-07-08 RX ORDER — GABAPENTIN 600 MG/1
TABLET ORAL
Qty: 180 TABLET | Refills: 5 | Status: SHIPPED | OUTPATIENT
Start: 2023-07-08 | End: 2024-01-06

## 2023-07-10 ENCOUNTER — TELEPHONE (OUTPATIENT)
Age: 73
End: 2023-07-10

## 2023-07-10 NOTE — TELEPHONE ENCOUNTER
----- Message from Elzbieta Napier sent at 7/10/2023 12:20 PM EDT -----  Subject: Medication Problem    Medication: gabapentin (NEURONTIN) 600 MG tablet  Dosage: 2 - 600 mg tablets 3 times per day   Ordering Provider: Shereen Pérez    Question/Problem: Patient stats he only received a 30 day supply of this   medication. Patient states he only has 2 refills on the prescription. Patient states the medication should come to him as a 90 day supply   through [facility]. Please contact patient regarding the medication.        Pharmacy: [address] [facility] [phone] Sethmyrtle Ruben [phone]    ---------------------------------------------------------------------------  --------------  Juan BAL  [phone]; OK to leave message on voicemail  ---------------------------------------------------------------------------  --------------    SCRIPT ANSWERS  Relationship to Patient: Self

## 2023-07-11 ENCOUNTER — TELEPHONE (OUTPATIENT)
Age: 73
End: 2023-07-11

## 2023-07-11 DIAGNOSIS — E11.42 DIABETIC POLYNEUROPATHY ASSOCIATED WITH TYPE 2 DIABETES MELLITUS (HCC): ICD-10-CM

## 2023-07-11 RX ORDER — GABAPENTIN 600 MG/1
1200 TABLET ORAL 3 TIMES DAILY
Qty: 540 TABLET | Refills: 1 | Status: SHIPPED | OUTPATIENT
Start: 2023-07-11 | End: 2023-07-12 | Stop reason: SDUPTHER

## 2023-07-11 NOTE — TELEPHONE ENCOUNTER
Patient states he has called Multiple times on his Gabapentin being sent in for the Incorrect Amount & Now Patient is also stating this was All supposed to be done thru [facility]. Please call to discuss & advise.  Thank you

## 2023-07-11 NOTE — TELEPHONE ENCOUNTER
Please resend Rx. 180 caps were sent in and a total of 540 should have been sent in. I did not know if you wanted to send in a new Rx with #360 caps or #540.      Please advise, thank you, Sybil Chapman

## 2023-07-11 NOTE — TELEPHONE ENCOUNTER
Two pt identifiers confirmed. I advised the patient the Gabapentin was sent into [facility]. He was ok with that. Pt verbalized understanding of information discussed w/ no further questions at this time.       Mendel Plumb, [address] [facility] [address], [address]  W: [phone]  F: [phone]

## 2023-07-12 DIAGNOSIS — E11.42 DIABETIC POLYNEUROPATHY ASSOCIATED WITH TYPE 2 DIABETES MELLITUS (HCC): ICD-10-CM

## 2023-07-12 RX ORDER — GABAPENTIN 600 MG/1
1200 TABLET ORAL 3 TIMES DAILY
Qty: 540 TABLET | Refills: 3 | Status: SHIPPED | OUTPATIENT
Start: 2023-07-12 | End: 2024-07-06

## 2023-07-12 NOTE — TELEPHONE ENCOUNTER
Wife called again    Regarding medication:  Gabapentin  [facility]  a 30 day supply  [facility] cancelled order the office sent    Problem:  States  med sent to wrong pharmacy and for wrong qty    States Enzo Mariaelena was supposed to get a 90 day supply    States needs the [facility] day supply

## 2023-07-12 NOTE — TELEPHONE ENCOUNTER
PCP: Kenney Cockayne, MD    Last appt: 5/26/2022  Future Appointments   Date Time Provider [address]   7/31/2023 10:30 AM Artis Tiwari MD [facility] AMB       Requested Prescriptions     Pending Prescriptions Disp Refills    gabapentin (NEURONTIN) 600 MG tablet 540 tablet 3     Sig: Take 2 tablets by mouth 3 times daily for 360 days.  TAKE 2 TABLETS THREE TIMES A DAY Max Daily Amount: 3,600 mg

## 2023-08-25 ENCOUNTER — TELEPHONE (OUTPATIENT)
Age: 73
End: 2023-08-25

## 2023-08-25 NOTE — TELEPHONE ENCOUNTER
Two pt identifiers confirmed. I advised his wife, per  the prescription is done and he will need to hold statin for 5 days. Patient's wife verbalized understanding of information discussed w/ no further questions at this time.

## 2023-08-25 NOTE — TELEPHONE ENCOUNTER
Two pt identifiers confirmed. I spoke to the patient's wife, I am her per ,    Rx for paxlovid. Get plenty of rest, drink plenty of fluids. Take over the counter vitamin C and zinc supplements. Tylenol may be taken as needed for pain and fever. Obtain a pulse oximeter; go to the emergency department if oxygen saturation goes below than 90%, or if heart rate (pulse) is higher than 125 beats per minute. Go to the emergency department if fever goes higher than 101.5 degrees. You can take OTC Mucinex or Coriciden HBP. Please self isolate for five days. Patient's wife verbalized understanding of information discussed w/ no further questions at this time.

## 2023-08-25 NOTE — TELEPHONE ENCOUNTER
Wife, Cornelius Lim states that pt tested positive for COVID on 8-24-23. She is also positive for COVID. No VV available. Please call to advise.

## 2023-09-14 ENCOUNTER — OFFICE VISIT (OUTPATIENT)
Age: 73
End: 2023-09-14
Payer: MEDICARE

## 2023-09-14 VITALS
BODY MASS INDEX: 29.67 KG/M2 | DIASTOLIC BLOOD PRESSURE: 84 MMHG | OXYGEN SATURATION: 98 % | HEART RATE: 64 BPM | WEIGHT: 184.6 LBS | HEIGHT: 66 IN | RESPIRATION RATE: 16 BRPM | SYSTOLIC BLOOD PRESSURE: 140 MMHG | TEMPERATURE: 97.5 F

## 2023-09-14 DIAGNOSIS — Z00.00 MEDICARE ANNUAL WELLNESS VISIT, SUBSEQUENT: Primary | ICD-10-CM

## 2023-09-14 DIAGNOSIS — E11.9 TYPE 2 DIABETES MELLITUS WITHOUT COMPLICATION, WITHOUT LONG-TERM CURRENT USE OF INSULIN (HCC): ICD-10-CM

## 2023-09-14 DIAGNOSIS — Z23 IMMUNIZATION DUE: ICD-10-CM

## 2023-09-14 DIAGNOSIS — I10 ESSENTIAL (PRIMARY) HYPERTENSION: ICD-10-CM

## 2023-09-14 DIAGNOSIS — E11.42 DIABETIC POLYNEUROPATHY ASSOCIATED WITH TYPE 2 DIABETES MELLITUS (HCC): ICD-10-CM

## 2023-09-14 PROCEDURE — 3079F DIAST BP 80-89 MM HG: CPT | Performed by: INTERNAL MEDICINE

## 2023-09-14 PROCEDURE — G8419 CALC BMI OUT NRM PARAM NOF/U: HCPCS | Performed by: INTERNAL MEDICINE

## 2023-09-14 PROCEDURE — G0008 ADMIN INFLUENZA VIRUS VAC: HCPCS | Performed by: INTERNAL MEDICINE

## 2023-09-14 PROCEDURE — 90694 VACC AIIV4 NO PRSRV 0.5ML IM: CPT | Performed by: INTERNAL MEDICINE

## 2023-09-14 PROCEDURE — 3017F COLORECTAL CA SCREEN DOC REV: CPT | Performed by: INTERNAL MEDICINE

## 2023-09-14 PROCEDURE — 2022F DILAT RTA XM EVC RTNOPTHY: CPT | Performed by: INTERNAL MEDICINE

## 2023-09-14 PROCEDURE — 3077F SYST BP >= 140 MM HG: CPT | Performed by: INTERNAL MEDICINE

## 2023-09-14 PROCEDURE — 1036F TOBACCO NON-USER: CPT | Performed by: INTERNAL MEDICINE

## 2023-09-14 PROCEDURE — 99214 OFFICE O/P EST MOD 30 MIN: CPT | Performed by: INTERNAL MEDICINE

## 2023-09-14 PROCEDURE — G8428 CUR MEDS NOT DOCUMENT: HCPCS | Performed by: INTERNAL MEDICINE

## 2023-09-14 PROCEDURE — G0439 PPPS, SUBSEQ VISIT: HCPCS | Performed by: INTERNAL MEDICINE

## 2023-09-14 PROCEDURE — 3046F HEMOGLOBIN A1C LEVEL >9.0%: CPT | Performed by: INTERNAL MEDICINE

## 2023-09-14 PROCEDURE — 1123F ACP DISCUSS/DSCN MKR DOCD: CPT | Performed by: INTERNAL MEDICINE

## 2023-09-14 PROCEDURE — PBSHW INFLUENZA, FLUAD, (AGE 65 Y+), IM, PF, 0.5 ML: Performed by: INTERNAL MEDICINE

## 2023-09-14 SDOH — ECONOMIC STABILITY: HOUSING INSECURITY
IN THE LAST 12 MONTHS, WAS THERE A TIME WHEN YOU DID NOT HAVE A STEADY PLACE TO SLEEP OR SLEPT IN A SHELTER (INCLUDING NOW)?: NO

## 2023-09-14 SDOH — ECONOMIC STABILITY: FOOD INSECURITY: WITHIN THE PAST 12 MONTHS, YOU WORRIED THAT YOUR FOOD WOULD RUN OUT BEFORE YOU GOT MONEY TO BUY MORE.: NEVER TRUE

## 2023-09-14 SDOH — ECONOMIC STABILITY: FOOD INSECURITY: WITHIN THE PAST 12 MONTHS, THE FOOD YOU BOUGHT JUST DIDN'T LAST AND YOU DIDN'T HAVE MONEY TO GET MORE.: NEVER TRUE

## 2023-09-14 SDOH — ECONOMIC STABILITY: INCOME INSECURITY: HOW HARD IS IT FOR YOU TO PAY FOR THE VERY BASICS LIKE FOOD, HOUSING, MEDICAL CARE, AND HEATING?: NOT HARD AT ALL

## 2023-09-14 ASSESSMENT — PATIENT HEALTH QUESTIONNAIRE - PHQ9
SUM OF ALL RESPONSES TO PHQ QUESTIONS 1-9: 0
2. FEELING DOWN, DEPRESSED OR HOPELESS: 0
SUM OF ALL RESPONSES TO PHQ9 QUESTIONS 1 & 2: 0
SUM OF ALL RESPONSES TO PHQ QUESTIONS 1-9: 0
1. LITTLE INTEREST OR PLEASURE IN DOING THINGS: 0

## 2023-09-14 ASSESSMENT — LIFESTYLE VARIABLES
HOW OFTEN DO YOU HAVE A DRINK CONTAINING ALCOHOL: NEVER
HOW MANY STANDARD DRINKS CONTAINING ALCOHOL DO YOU HAVE ON A TYPICAL DAY: PATIENT DOES NOT DRINK

## 2023-09-14 NOTE — PROGRESS NOTES
SUBJECTIVE:   Mr. Laney Ang. is a 75 y.o. male who is here for follow up of routine medical issues. Previously saw Dr. Lorna Hair. Chief Complaint   Patient presents with    Medicare AWV     We have noted before that he sees ophthalmologist Dr. Angus Harada for what sounds like retinopathy; getting \"that needle in the eye. \"  Due for follow up, but he has put this off due to [facility]. Glc at home runs mid 100s. No longer seeing Dr. Rima Shoemaker. He sees Dr. Whitney Leon for CAD. Denies CP. 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 Dr. Argenis Whitfield did EMG/NCV in Jan 2014. At this time, he is otherwise doing well and has brought no other complaints to my attention today. For a list of the medical issues addressed today, see the assessment and plan below. PMH:   Past Medical History:   Diagnosis Date    CAD (coronary artery disease)     Diabetes ([address])     Heart disease     Hypertension        Past Surgical History:   Procedure Laterality Date    HEMORRHOID SURGERY      [place] UNLISTED PROCEDURE CARDIAC SURGERY       All: Patient has no known allergies. Current Outpatient Medications on File Prior to Visit   Medication Sig Dispense Refill    gabapentin (NEURONTIN) 600 MG tablet Take 2 tablets by mouth 3 times daily for 360 days.  TAKE 2 TABLETS THREE TIMES A DAY Max Daily Amount: 3,600 mg 540 tablet 3    insulin detemir (LEVEMIR FLEXTOUCH) 100 UNIT/ML injection pen INJECT 45 UNITS UNDER THE SKIN NIGHTLY 5 Adjustable Dose Pre-filled Pen Syringe 5    aspirin 81 MG chewable tablet Take 1 tablet by mouth daily      clopidogrel (PLAVIX) 75 MG tablet Take 1 tablet by mouth daily      ezetimibe (ZETIA) 10 MG tablet Take 1 tablet by mouth daily      metFORMIN (GLUCOPHAGE) 500 MG tablet TAKE 1 TABLET TWICE A DAY WITH MEALS      metoprolol tartrate (LOPRESSOR) 25 MG tablet TAKE 1 TABLET TWICE A DAY      pneumococcal 13-valent conjugate (PREVNAR) SUSP inj Inject 0.5 mLs into the muscle      pravastatin

## 2023-09-14 NOTE — PROGRESS NOTES
1. \"Have you been to the ER, urgent care clinic since your last visit? Hospitalized since your last visit? \" No    2. \"Have you seen or consulted any other health care providers outside of the [address] [place] [address] since your last visit? \" No     3. For patients aged 43-73: Has the patient had a colonoscopy / FIT/ Cologuard?  Yes - no Care Gap present

## 2023-09-15 ENCOUNTER — NURSE ONLY (OUTPATIENT)
Age: 73
End: 2023-09-15

## 2023-09-15 DIAGNOSIS — E11.9 TYPE 2 DIABETES MELLITUS WITHOUT COMPLICATION, WITHOUT LONG-TERM CURRENT USE OF INSULIN (HCC): ICD-10-CM

## 2023-09-15 LAB
ALBUMIN SERPL-MCNC: 4 G/DL (ref 3.5–5)
ALBUMIN/GLOB SERPL: 1.4 (ref 1.1–2.2)
ALP SERPL-CCNC: 58 U/L (ref 45–117)
ALT SERPL-CCNC: 20 U/L (ref 12–78)
ANION GAP SERPL CALC-SCNC: 5 MMOL/L (ref 5–15)
AST SERPL-CCNC: 13 U/L (ref 15–37)
BASOPHILS # BLD: 0 K/UL (ref 0–0.1)
BASOPHILS NFR BLD: 1 % (ref 0–1)
BILIRUB SERPL-MCNC: 0.5 MG/DL (ref 0.2–1)
BUN SERPL-MCNC: 25 MG/DL (ref 6–20)
BUN/CREAT SERPL: 19 (ref 12–20)
CALCIUM SERPL-MCNC: 9.2 MG/DL (ref 8.5–10.1)
CHLORIDE SERPL-SCNC: 106 MMOL/L (ref 97–108)
CHOLEST SERPL-MCNC: 171 MG/DL
CO2 SERPL-SCNC: 28 MMOL/L (ref 21–32)
CREAT SERPL-MCNC: 1.29 MG/DL (ref 0.7–1.3)
CREAT UR-MCNC: 103 MG/DL
DIFFERENTIAL METHOD BLD: NORMAL
EOSINOPHIL # BLD: 0.1 K/UL (ref 0–0.4)
EOSINOPHIL NFR BLD: 1 % (ref 0–7)
ERYTHROCYTE [DISTWIDTH] IN BLOOD BY AUTOMATED COUNT: 12.7 % (ref 11.5–14.5)
EST. AVERAGE GLUCOSE BLD GHB EST-MCNC: 131 MG/DL
GLOBULIN SER CALC-MCNC: 2.8 G/DL (ref 2–4)
GLUCOSE SERPL-MCNC: 117 MG/DL (ref 65–100)
HBA1C MFR BLD: 6.2 % (ref 4–5.6)
HCT VFR BLD AUTO: 45.1 % (ref 36.6–50.3)
HDLC SERPL-MCNC: 51 MG/DL
HDLC SERPL: 3.4 (ref 0–5)
HGB BLD-MCNC: 14.8 G/DL (ref 12.1–17)
IMM GRANULOCYTES # BLD AUTO: 0 K/UL (ref 0–0.04)
IMM GRANULOCYTES NFR BLD AUTO: 0 % (ref 0–0.5)
LDLC SERPL CALC-MCNC: 105.4 MG/DL (ref 0–100)
LYMPHOCYTES # BLD: 1.4 K/UL (ref 0.8–3.5)
LYMPHOCYTES NFR BLD: 21 % (ref 12–49)
MCH RBC QN AUTO: 31.2 PG (ref 26–34)
MCHC RBC AUTO-ENTMCNC: 32.8 G/DL (ref 30–36.5)
MCV RBC AUTO: 95.1 FL (ref 80–99)
MICROALBUMIN UR-MCNC: 3.77 MG/DL
MICROALBUMIN/CREAT UR-RTO: 37 MG/G (ref 0–30)
MONOCYTES # BLD: 0.7 K/UL (ref 0–1)
MONOCYTES NFR BLD: 10 % (ref 5–13)
NEUTS SEG # BLD: 4.5 K/UL (ref 1.8–8)
NEUTS SEG NFR BLD: 67 % (ref 32–75)
NRBC # BLD: 0 K/UL (ref 0–0.01)
NRBC BLD-RTO: 0 PER 100 WBC
PLATELET # BLD AUTO: 187 K/UL (ref 150–400)
PMV BLD AUTO: 11.2 FL (ref 8.9–12.9)
POTASSIUM SERPL-SCNC: 4.2 MMOL/L (ref 3.5–5.1)
PROT SERPL-MCNC: 6.8 G/DL (ref 6.4–8.2)
RBC # BLD AUTO: 4.74 M/UL (ref 4.1–5.7)
SODIUM SERPL-SCNC: 139 MMOL/L (ref 136–145)
TRIGL SERPL-MCNC: 73 MG/DL
VLDLC SERPL CALC-MCNC: 14.6 MG/DL
WBC # BLD AUTO: 6.6 K/UL (ref 4.1–11.1)

## 2023-10-05 ENCOUNTER — TELEPHONE (OUTPATIENT)
Age: 73
End: 2023-10-05

## 2023-10-11 DIAGNOSIS — I10 ESSENTIAL HYPERTENSION: Primary | ICD-10-CM

## 2023-10-11 NOTE — TELEPHONE ENCOUNTER
telmisartan-hydroCHLOROthiazide (MICARDIS HCT) 80-25 MG per tablet    90 day refill to [facility]

## 2023-10-11 NOTE — TELEPHONE ENCOUNTER
Future Appointments:  No future appointments.      Last Appointment With Me:  9/14/2023     Requested Prescriptions     Pending Prescriptions Disp Refills    telmisartan-hydroCHLOROthiazide (MICARDIS HCT) 80-25 MG per tablet 90 tablet 3     Sig: Take 1 tablet by mouth daily

## 2023-10-11 NOTE — TELEPHONE ENCOUNTER
Pt states that he called last week for lab results. He has yet to get a call back. Why he ask? He also would like the lab results mailed to his home.   Address verified

## 2023-10-12 RX ORDER — TELMISARTAN AND HYDROCHLORTHIAZIDE 80; 25 MG/1; MG/1
1 TABLET ORAL DAILY
Qty: 90 TABLET | Refills: 3 | Status: SHIPPED | OUTPATIENT
Start: 2023-10-12

## 2023-10-30 ENCOUNTER — TELEPHONE (OUTPATIENT)
Age: 73
End: 2023-10-30

## 2023-10-30 NOTE — TELEPHONE ENCOUNTER
Patients wife called in [address] he is no longer diabetic and wants to know if patient can stop taking insulin, if so please call to advise on how to slowly take him off insulin   [phone]

## 2023-11-06 ENCOUNTER — OFFICE VISIT (OUTPATIENT)
Age: 73
End: 2023-11-06
Payer: MEDICARE

## 2023-11-06 VITALS
HEIGHT: 66 IN | RESPIRATION RATE: 16 BRPM | DIASTOLIC BLOOD PRESSURE: 72 MMHG | BODY MASS INDEX: 30.37 KG/M2 | TEMPERATURE: 98.1 F | WEIGHT: 189 LBS | SYSTOLIC BLOOD PRESSURE: 114 MMHG | HEART RATE: 65 BPM | OXYGEN SATURATION: 98 %

## 2023-11-06 DIAGNOSIS — E11.9 TYPE 2 DIABETES MELLITUS WITHOUT COMPLICATION, WITH LONG-TERM CURRENT USE OF INSULIN (HCC): Primary | ICD-10-CM

## 2023-11-06 DIAGNOSIS — Z79.4 TYPE 2 DIABETES MELLITUS WITHOUT COMPLICATION, WITH LONG-TERM CURRENT USE OF INSULIN (HCC): Primary | ICD-10-CM

## 2023-11-06 PROCEDURE — 99213 OFFICE O/P EST LOW 20 MIN: CPT | Performed by: INTERNAL MEDICINE

## 2023-11-06 PROCEDURE — 3017F COLORECTAL CA SCREEN DOC REV: CPT | Performed by: INTERNAL MEDICINE

## 2023-11-06 PROCEDURE — G8417 CALC BMI ABV UP PARAM F/U: HCPCS | Performed by: INTERNAL MEDICINE

## 2023-11-06 PROCEDURE — 1123F ACP DISCUSS/DSCN MKR DOCD: CPT | Performed by: INTERNAL MEDICINE

## 2023-11-06 PROCEDURE — G8484 FLU IMMUNIZE NO ADMIN: HCPCS | Performed by: INTERNAL MEDICINE

## 2023-11-06 PROCEDURE — 3074F SYST BP LT 130 MM HG: CPT | Performed by: INTERNAL MEDICINE

## 2023-11-06 PROCEDURE — 3078F DIAST BP <80 MM HG: CPT | Performed by: INTERNAL MEDICINE

## 2023-11-06 PROCEDURE — 2022F DILAT RTA XM EVC RTNOPTHY: CPT | Performed by: INTERNAL MEDICINE

## 2023-11-06 PROCEDURE — G8427 DOCREV CUR MEDS BY ELIG CLIN: HCPCS | Performed by: INTERNAL MEDICINE

## 2023-11-06 PROCEDURE — 1036F TOBACCO NON-USER: CPT | Performed by: INTERNAL MEDICINE

## 2023-11-06 PROCEDURE — 3044F HG A1C LEVEL LT 7.0%: CPT | Performed by: INTERNAL MEDICINE

## 2023-11-06 ASSESSMENT — PATIENT HEALTH QUESTIONNAIRE - PHQ9
SUM OF ALL RESPONSES TO PHQ9 QUESTIONS 1 & 2: 0
2. FEELING DOWN, DEPRESSED OR HOPELESS: 0
1. LITTLE INTEREST OR PLEASURE IN DOING THINGS: 0
SUM OF ALL RESPONSES TO PHQ QUESTIONS 1-9: 0

## 2024-02-06 DIAGNOSIS — E11.42 DIABETIC POLYNEUROPATHY ASSOCIATED WITH TYPE 2 DIABETES MELLITUS (HCC): ICD-10-CM

## 2024-02-06 RX ORDER — GABAPENTIN 600 MG/1
1200 TABLET ORAL 3 TIMES DAILY
Qty: 540 TABLET | Refills: 1 | Status: SHIPPED | OUTPATIENT
Start: 2024-02-06 | End: 2025-01-31

## 2024-02-06 NOTE — TELEPHONE ENCOUNTER
Future Appointments:  No future appointments.     Last Appointment With Me:  11/6/2023     Requested Prescriptions     Pending Prescriptions Disp Refills    gabapentin (NEURONTIN) 600 MG tablet 540 tablet 1     Sig: Take 2 tablets by mouth 3 times daily for 360 days. TAKE 2 TABLETS THREE TIMES A DAY Max Daily Amount: 3,600 mg

## 2024-03-11 DIAGNOSIS — E11.9 TYPE 2 DIABETES MELLITUS WITHOUT COMPLICATION, WITHOUT LONG-TERM CURRENT USE OF INSULIN (HCC): Primary | ICD-10-CM

## 2024-03-20 ENCOUNTER — TELEPHONE (OUTPATIENT)
Age: 74
End: 2024-03-20

## 2024-03-20 NOTE — TELEPHONE ENCOUNTER
----- Message from Julie Behan sent at 3/20/2024  8:40 AM EDT -----  Subject: Message to Provider    QUESTIONS  Information for Provider? patient states he has covid and wants a   prescription, please call asap  ---------------------------------------------------------------------------  --------------  CALL BACK INFO  [phone]; OK to leave message on voicemail  ---------------------------------------------------------------------------  --------------  SCRIPT ANSWERS  Relationship to Patient? Self

## 2024-04-10 DIAGNOSIS — I10 ESSENTIAL HYPERTENSION: ICD-10-CM

## 2024-04-10 RX ORDER — TELMISARTAN AND HYDROCHLORTHIAZIDE 80; 25 MG/1; MG/1
1 TABLET ORAL DAILY
Qty: 90 TABLET | Refills: 3 | Status: SHIPPED | OUTPATIENT
Start: 2024-04-10

## 2024-04-10 NOTE — TELEPHONE ENCOUNTER
PCP: Jluis Levi MD    Last appt:   11/6/2023    No future appointments.    Requested Prescriptions     Pending Prescriptions Disp Refills    telmisartan-hydroCHLOROthiazide (MICARDIS HCT) 80-25 MG per tablet 90 tablet 3     Sig: Take 1 tablet by mouth daily

## 2024-04-10 NOTE — TELEPHONE ENCOUNTER
----- Message from Monika Bentley sent at 4/10/2024  9:46 AM EDT -----  Subject: Medication Problem     Medication: telmisartan-hydroCHLOROthiazide (MICARDIS HCT) 80-25 MG per   tablet  Dosage: 80-25 mg once a day   Ordering Provider: WILL CAMPOS    Question/Problem: [facility]   before they can fill it.       Pharmacy: [facility] - [address] [phone] - F [phone]    ---------------------------------------------------------------------------  --------------  CALL BACK INFO  [phone]; OK to leave message on voicemail  ---------------------------------------------------------------------------  --------------    SCRIPT ANSWERS  Relationship to Patient: Self

## 2024-07-09 ENCOUNTER — OFFICE VISIT (OUTPATIENT)
Age: 74
End: 2024-07-09
Payer: MEDICARE

## 2024-07-09 VITALS
HEART RATE: 60 BPM | HEIGHT: 66 IN | OXYGEN SATURATION: 97 % | SYSTOLIC BLOOD PRESSURE: 133 MMHG | TEMPERATURE: 97.3 F | BODY MASS INDEX: 30.7 KG/M2 | DIASTOLIC BLOOD PRESSURE: 77 MMHG | RESPIRATION RATE: 16 BRPM | WEIGHT: 191 LBS

## 2024-07-09 DIAGNOSIS — E78.00 HYPERCHOLESTEREMIA: ICD-10-CM

## 2024-07-09 DIAGNOSIS — E11.42 DIABETIC POLYNEUROPATHY ASSOCIATED WITH TYPE 2 DIABETES MELLITUS (HCC): ICD-10-CM

## 2024-07-09 DIAGNOSIS — I10 ESSENTIAL HYPERTENSION: ICD-10-CM

## 2024-07-09 DIAGNOSIS — E11.9 TYPE 2 DIABETES MELLITUS WITHOUT COMPLICATION, WITHOUT LONG-TERM CURRENT USE OF INSULIN (HCC): Primary | ICD-10-CM

## 2024-07-09 PROCEDURE — 99214 OFFICE O/P EST MOD 30 MIN: CPT | Performed by: INTERNAL MEDICINE

## 2024-07-09 ASSESSMENT — PATIENT HEALTH QUESTIONNAIRE - PHQ9
SUM OF ALL RESPONSES TO PHQ QUESTIONS 1-9: 0
SUM OF ALL RESPONSES TO PHQ QUESTIONS 1-9: 0
1. LITTLE INTEREST OR PLEASURE IN DOING THINGS: NOT AT ALL
SUM OF ALL RESPONSES TO PHQ QUESTIONS 1-9: 0
2. FEELING DOWN, DEPRESSED OR HOPELESS: NOT AT ALL
SUM OF ALL RESPONSES TO PHQ QUESTIONS 1-9: 0
SUM OF ALL RESPONSES TO PHQ9 QUESTIONS 1 & 2: 0

## 2024-07-09 NOTE — PROGRESS NOTES
\"Have you been to the ER, urgent care clinic since your last visit?  Hospitalized since your last visit?\"    NO    “Have you seen or consulted any other health care providers outside of [facility] since your last visit?”    NO        “Have you had a colorectal cancer screening such as a colonoscopy/FIT/Cologuard?    NO    No colonoscopy on file  Date of last Cologuard: 11/16/2020  No FIT/FOBT on file   No flexible sigmoidoscopy on file         Click Here for Release of Records Request

## 2024-07-09 NOTE — PROGRESS NOTES
SUBJECTIVE:   Mr. Tyrell Balderrama Jr. is a 74 y.o. male who is here for follow up of routine medical issues.   Previously saw Dr. Steven Carvalho.     Chief Complaint   Patient presents with    Follow-up     We have noted before that he sees ophthalmologist Dr. Wright for what sounds like retinopathy; getting \"that needle in the eye.\"  Due for follow up, but he has put this off due to COVID.     DM: Glc at home runs low to mid 100s. No longer seeing Dr. Martinez.    He sees Dr. Grey for CAD.  Denies CP.      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  Dr. Silver did EMG/NCV in Jan 2014.    At this time, he is otherwise doing well and has brought no other complaints to my attention today.  For a list of the medical issues addressed today, see the assessment and plan below.    PMH:   Past Medical History:   Diagnosis Date    CAD (coronary artery disease)     Diabetes (HCC)     Heart disease     Hypertension        Past Surgical History:   Procedure Laterality Date    HEMORRHOID SURGERY      [place] UNLISTED PROCEDURE CARDIAC SURGERY       All: Atorvastatin calcium and Statins    Current Outpatient Medications on File Prior to Visit   Medication Sig Dispense Refill    telmisartan-hydroCHLOROthiazide (MICARDIS HCT) 80-25 MG per tablet Take 1 tablet by mouth daily 90 tablet 3    metFORMIN (GLUCOPHAGE) 500 MG tablet Take 1 tablet by mouth 2 times daily (with meals) 180 tablet 3    gabapentin (NEURONTIN) 600 MG tablet Take 2 tablets by mouth 3 times daily for 360 days. TAKE 2 TABLETS THREE TIMES A DAY Max Daily Amount: 3,600 mg 540 tablet 1    insulin detemir (LEVEMIR FLEXTOUCH) 100 UNIT/ML injection pen INJECT 45 UNITS UNDER THE SKIN NIGHTLY 5 Adjustable Dose Pre-filled Pen Syringe 5    aspirin 81 MG chewable tablet Take 1 tablet by mouth daily      clopidogrel (PLAVIX) 75 MG tablet Take 1 tablet by mouth daily      ezetimibe (ZETIA) 10 MG tablet Take 1 tablet by mouth daily      metoprolol tartrate (LOPRESSOR) 25 MG

## 2024-08-22 DIAGNOSIS — E11.42 DIABETIC POLYNEUROPATHY ASSOCIATED WITH TYPE 2 DIABETES MELLITUS (HCC): ICD-10-CM

## 2024-08-22 RX ORDER — GABAPENTIN 600 MG/1
1200 TABLET ORAL 3 TIMES DAILY
Qty: 540 TABLET | Refills: 1 | Status: SHIPPED | OUTPATIENT
Start: 2024-08-22 | End: 2025-08-17

## 2024-08-26 DIAGNOSIS — E11.42 DIABETIC POLYNEUROPATHY ASSOCIATED WITH TYPE 2 DIABETES MELLITUS (HCC): ICD-10-CM

## 2024-08-26 RX ORDER — GABAPENTIN 600 MG/1
1200 TABLET ORAL 3 TIMES DAILY
Qty: 540 TABLET | Refills: 1 | Status: SHIPPED | OUTPATIENT
Start: 2024-08-26 | End: 2025-08-21

## 2024-08-26 NOTE — TELEPHONE ENCOUNTER
PCP: Jluis Levi MD    Last appt: 7/9/2024  Future Appointments   Date Time Provider Department Center   1/15/2025  9:00 AM Jluis Levi MD [facility] DEP       Requested Prescriptions     Pending Prescriptions Disp Refills    gabapentin (NEURONTIN) 600 MG tablet 540 tablet 1     Sig: Take 2 tablets by mouth 3 times daily for 360 days. TAKE 2 TABLETS THREE TIMES A DAY Max Daily Amount: 3,600 mg

## 2024-08-26 NOTE — TELEPHONE ENCOUNTER
Pt got the [facility] to cancel the script for gabapentin there as it is too much money.    Pt would like this to go to [facility] for 90 days.     gabapentin (NEURONTIN) 600 MG tablet      Pt needs as soon as possible/can this get sent to mail order right away he ask?

## 2024-08-28 ENCOUNTER — TELEPHONE (OUTPATIENT)
Age: 74
End: 2024-08-28

## 2024-10-22 ENCOUNTER — OFFICE VISIT (OUTPATIENT)
Age: 74
End: 2024-10-22
Payer: MEDICARE

## 2024-10-22 VITALS
TEMPERATURE: 97 F | SYSTOLIC BLOOD PRESSURE: 153 MMHG | DIASTOLIC BLOOD PRESSURE: 80 MMHG | BODY MASS INDEX: 31.34 KG/M2 | WEIGHT: 195 LBS | HEIGHT: 66 IN | HEART RATE: 57 BPM | RESPIRATION RATE: 16 BRPM | OXYGEN SATURATION: 99 %

## 2024-10-22 DIAGNOSIS — H61.22 LEFT EAR IMPACTED CERUMEN: Primary | ICD-10-CM

## 2024-10-22 PROCEDURE — 1123F ACP DISCUSS/DSCN MKR DOCD: CPT | Performed by: INTERNAL MEDICINE

## 2024-10-22 PROCEDURE — G8484 FLU IMMUNIZE NO ADMIN: HCPCS | Performed by: INTERNAL MEDICINE

## 2024-10-22 PROCEDURE — 99213 OFFICE O/P EST LOW 20 MIN: CPT | Performed by: INTERNAL MEDICINE

## 2024-10-22 PROCEDURE — 3077F SYST BP >= 140 MM HG: CPT | Performed by: INTERNAL MEDICINE

## 2024-10-22 PROCEDURE — G8417 CALC BMI ABV UP PARAM F/U: HCPCS | Performed by: INTERNAL MEDICINE

## 2024-10-22 PROCEDURE — 1036F TOBACCO NON-USER: CPT | Performed by: INTERNAL MEDICINE

## 2024-10-22 PROCEDURE — 3017F COLORECTAL CA SCREEN DOC REV: CPT | Performed by: INTERNAL MEDICINE

## 2024-10-22 PROCEDURE — 3079F DIAST BP 80-89 MM HG: CPT | Performed by: INTERNAL MEDICINE

## 2024-10-22 PROCEDURE — G8427 DOCREV CUR MEDS BY ELIG CLIN: HCPCS | Performed by: INTERNAL MEDICINE

## 2024-10-22 NOTE — PROGRESS NOTES
PROGRESS NOTE  Name: Tyrell Balderrama Jr.   : 1950       ASSESSMENT/ PLAN:     Tyrell was seen today for other.    Diagnoses and all orders for this visit:    Left ear impacted cerumen  -     TANYA - Dylon Uribe MD, Otolaryngology, [place]    Return in 3 months (on 2025) for Diabetes Mellitus.     I have reviewed the patient's medications and risks/side effects/benefits were discussed. Diagnosis(-es) explained to patient and questions answered. Literature provided where appropriate.                       SUBJECTIVE  Mr. Tyrell Balderrama Jr. presents today acutely for     Chief Complaint   Patient presents with    Other     Pt c/o trouble with hearing in the left area      He has had some sensation of fullness, as well as loss of hearing in the left ear.  He has a history of problems with earwax.    OBJECTIVE  BP (!) 153/80 (Site: Left Upper Arm, Position: Sitting, Cuff Size: Large Adult)   Pulse 57   Temp 97 °F (36.1 °C) (Temporal)   Resp 16   Ht 1.676 m (5' 6\")   Wt 88.5 kg (195 lb)   SpO2 99%   BMI 31.47 kg/m²   Gen: Pleasant 74 y.o.  male in NAD.   HEENT: PERRLA. EOMI. L ear cerumen impaction.  OP moist and pink.  Neck: Supple.  No LAD.

## 2024-11-04 DIAGNOSIS — I10 ESSENTIAL HYPERTENSION: ICD-10-CM

## 2024-11-04 RX ORDER — TELMISARTAN AND HYDROCHLORTHIAZIDE 80; 25 MG/1; MG/1
1 TABLET ORAL DAILY
Qty: 90 TABLET | Refills: 3 | Status: SHIPPED | OUTPATIENT
Start: 2024-11-04

## 2024-11-04 NOTE — TELEPHONE ENCOUNTER
Pt states he needs a refill on Losartan.  I do not find on his list or in hsitory.    [facility] to [facility] 90 day refill    Pt has 7 tablets left he states.

## 2024-11-04 NOTE — TELEPHONE ENCOUNTER
The patient called back, he stated he is not taking losartan, he was mistaken. He is taking Micardis not losartan.    Future Appointments:  Future Appointments   Date Time Provider Department Center   1/15/2025  9:00 AM Jluis Levi MD [facility] DEP        Last Appointment With Me:  10/22/2024     Requested Prescriptions     Pending Prescriptions Disp Refills    telmisartan-hydroCHLOROthiazide (MICARDIS HCT) 80-25 MG per tablet 90 tablet 3     Sig: Take 1 tablet by mouth daily

## 2024-12-14 ENCOUNTER — APPOINTMENT (OUTPATIENT)
Facility: HOSPITAL | Age: 74
End: 2024-12-14
Payer: MEDICARE

## 2024-12-14 ENCOUNTER — HOSPITAL ENCOUNTER (EMERGENCY)
Facility: HOSPITAL | Age: 74
Discharge: HOME | End: 2024-12-14
Payer: MEDICARE

## 2024-12-14 VITALS
DIASTOLIC BLOOD PRESSURE: 79 MMHG | TEMPERATURE: 100.7 F | RESPIRATION RATE: 20 BRPM | OXYGEN SATURATION: 94 % | HEIGHT: 66 IN | SYSTOLIC BLOOD PRESSURE: 163 MMHG | BODY MASS INDEX: 31.34 KG/M2 | HEART RATE: 77 BPM | WEIGHT: 195 LBS

## 2024-12-14 DIAGNOSIS — J11.1 INFLUENZA: Primary | ICD-10-CM

## 2024-12-14 LAB
ALBUMIN SERPL-MCNC: 3.5 G/DL (ref 3.5–5)
ALBUMIN/GLOB SERPL: 1.1 (ref 1.1–2.2)
ALP SERPL-CCNC: 85 U/L (ref 45–117)
ALT SERPL-CCNC: 20 U/L (ref 12–78)
ANION GAP SERPL CALC-SCNC: 5 MMOL/L (ref 2–12)
AST SERPL-CCNC: 11 U/L (ref 15–37)
BASOPHILS # BLD: 0 K/UL (ref 0–0.1)
BASOPHILS NFR BLD: 0 % (ref 0–1)
BILIRUB SERPL-MCNC: 0.7 MG/DL (ref 0.2–1)
BUN SERPL-MCNC: 22 MG/DL (ref 6–20)
BUN/CREAT SERPL: 14 (ref 12–20)
CALCIUM SERPL-MCNC: 8.6 MG/DL (ref 8.5–10.1)
CHLORIDE SERPL-SCNC: 102 MMOL/L (ref 97–108)
CO2 SERPL-SCNC: 28 MMOL/L (ref 21–32)
CREAT SERPL-MCNC: 1.61 MG/DL (ref 0.7–1.3)
DIFFERENTIAL METHOD BLD: ABNORMAL
EOSINOPHIL # BLD: 0 K/UL (ref 0–0.4)
EOSINOPHIL NFR BLD: 0 % (ref 0–7)
ERYTHROCYTE [DISTWIDTH] IN BLOOD BY AUTOMATED COUNT: 12.1 % (ref 11.5–14.5)
FLUAV RNA SPEC QL NAA+PROBE: DETECTED
FLUBV RNA SPEC QL NAA+PROBE: NOT DETECTED
GLOBULIN SER CALC-MCNC: 3.3 G/DL (ref 2–4)
GLUCOSE SERPL-MCNC: 376 MG/DL (ref 65–100)
HCT VFR BLD AUTO: 45 % (ref 36.6–50.3)
HGB BLD-MCNC: 15.4 G/DL (ref 12.1–17)
IMM GRANULOCYTES # BLD AUTO: 0 K/UL (ref 0–0.04)
IMM GRANULOCYTES NFR BLD AUTO: 0 % (ref 0–0.5)
LYMPHOCYTES # BLD: 0.7 K/UL (ref 0.8–3.5)
LYMPHOCYTES NFR BLD: 10 % (ref 12–49)
MAGNESIUM SERPL-MCNC: 1.9 MG/DL (ref 1.6–2.4)
MCH RBC QN AUTO: 32.2 PG (ref 26–34)
MCHC RBC AUTO-ENTMCNC: 34.2 G/DL (ref 30–36.5)
MCV RBC AUTO: 94.1 FL (ref 80–99)
MONOCYTES # BLD: 0.7 K/UL (ref 0–1)
MONOCYTES NFR BLD: 10 % (ref 5–13)
NEUTS SEG # BLD: 5.9 K/UL (ref 1.8–8)
NEUTS SEG NFR BLD: 80 % (ref 32–75)
NRBC # BLD: 0 K/UL (ref 0–0.01)
NRBC BLD-RTO: 0 PER 100 WBC
PLATELET # BLD AUTO: 144 K/UL (ref 150–400)
PMV BLD AUTO: 11.4 FL (ref 8.9–12.9)
POTASSIUM SERPL-SCNC: 4.3 MMOL/L (ref 3.5–5.1)
PROT SERPL-MCNC: 6.8 G/DL (ref 6.4–8.2)
RBC # BLD AUTO: 4.78 M/UL (ref 4.1–5.7)
RBC MORPH BLD: ABNORMAL
SARS-COV-2 RNA RESP QL NAA+PROBE: NOT DETECTED
SODIUM SERPL-SCNC: 135 MMOL/L (ref 136–145)
SOURCE: ABNORMAL
TROPONIN I SERPL HS-MCNC: 8 NG/L (ref 0–76)
WBC # BLD AUTO: 7.3 K/UL (ref 4.1–11.1)

## 2024-12-14 PROCEDURE — 80053 COMPREHEN METABOLIC PANEL: CPT

## 2024-12-14 PROCEDURE — 36415 COLL VENOUS BLD VENIPUNCTURE: CPT

## 2024-12-14 PROCEDURE — 83735 ASSAY OF MAGNESIUM: CPT

## 2024-12-14 PROCEDURE — 87636 SARSCOV2 & INF A&B AMP PRB: CPT

## 2024-12-14 PROCEDURE — 84484 ASSAY OF TROPONIN QUANT: CPT

## 2024-12-14 PROCEDURE — 99285 EMERGENCY DEPT VISIT HI MDM: CPT

## 2024-12-14 PROCEDURE — 85025 COMPLETE CBC W/AUTO DIFF WBC: CPT

## 2024-12-14 PROCEDURE — 6370000000 HC RX 637 (ALT 250 FOR IP)

## 2024-12-14 PROCEDURE — 71046 X-RAY EXAM CHEST 2 VIEWS: CPT

## 2024-12-14 RX ORDER — ACETAMINOPHEN 325 MG/1
650 TABLET ORAL
Status: COMPLETED | OUTPATIENT
Start: 2024-12-14 | End: 2024-12-14

## 2024-12-14 RX ADMIN — ACETAMINOPHEN 650 MG: 325 TABLET ORAL at 12:17

## 2024-12-14 NOTE — ED NOTES
Patient complaining of cough, shortness of breath, fever, and chills x3 days. Patient placed on the monitor x3 with call bell within reach and side rails x2.

## 2024-12-14 NOTE — DISCHARGE INSTRUCTIONS
Thank You!    It was a pleasure taking care of you in our Emergency Department today. We know that when you come to our Emergency Department, you are entrusting us with your health, comfort, and safety. Our physicians and nurses honor that trust, and truly appreciate the opportunity to care for you and your loved ones.      We also value your feedback. If you receive a survey about your Emergency Department experience today, please fill it out.  We care about our patients' feedback, and we listen to what you have to say.  Thank you.    Rk Wiseman MD  ________________________________________________________________________  I have included a copy of your lab results and/or radiologic studies from today's visit so you can have them easily available at your follow-up visit. We hope you feel better and please do not hesitate to contact the ED if you have any questions at all!    Recent Results (from the past 12 hour(s))   CBC with Auto Differential    Collection Time: 12/14/24 11:11 AM   Result Value Ref Range    WBC 7.3 4.1 - 11.1 K/uL    RBC 4.78 4.10 - 5.70 M/uL    Hemoglobin 15.4 12.1 - 17.0 g/dL    Hematocrit 45.0 36.6 - 50.3 %    MCV 94.1 80.0 - 99.0 FL    MCH 32.2 26.0 - 34.0 PG    MCHC 34.2 30.0 - 36.5 g/dL    RDW 12.1 11.5 - 14.5 %    Platelets 144 (L) 150 - 400 K/uL    MPV 11.4 8.9 - 12.9 FL    Nucleated RBCs 0.0 0  WBC    nRBC 0.00 0.00 - 0.01 K/uL    Neutrophils % 80 (H) 32 - 75 %    Lymphocytes % 10 (L) 12 - 49 %    Monocytes % 10 5 - 13 %    Eosinophils % 0 0 - 7 %    Basophils % 0 0 - 1 %    Immature Granulocytes % 0 0.0 - 0.5 %    Neutrophils Absolute 5.9 1.8 - 8.0 K/UL    Lymphocytes Absolute 0.7 (L) 0.8 - 3.5 K/UL    Monocytes Absolute 0.7 0.0 - 1.0 K/UL    Eosinophils Absolute 0.0 0.0 - 0.4 K/UL    Basophils Absolute 0.0 0.0 - 0.1 K/UL    Immature Granulocytes Absolute 0.0 0.00 - 0.04 K/UL    Differential Type SMEAR SCANNED      RBC Comment NORMOCYTIC, NORMOCHROMIC     Comprehensive

## 2024-12-18 ENCOUNTER — TELEPHONE (OUTPATIENT)
Age: 74
End: 2024-12-18

## 2024-12-18 LAB — HBA1C MFR BLD HPLC: 11.8 %

## 2024-12-18 NOTE — TELEPHONE ENCOUNTER
Tabitha's wife, Ilda, states patient has had the flu for about a week. Urgent care did not give the patient medication to help.    Ilda is requesting for medication to help the patient.     Please call patient to advise

## 2024-12-21 NOTE — ED PROVIDER NOTES
[facility] EMERGENCY DEPT  EMERGENCY DEPARTMENT ENCOUNTER    Patient Name: Tyrell Balderrama Jr.  MRN: [id number]  YOB: 1950  Provider: Rk Wiseman MD  PCP: Jluis Levi MD  Time/Date of evaluation: 12:55 PM EST on 12/21/24    History of Presenting Illness     Chief Complaint   Patient presents with    Cough     Pt arrives via wheelchair from home d/t cough, fevers, chills and dyspnea x 3 days. Pt denies any sick contacts, CP/dizziness/n/v/d or other sx att      History Provided by: Patient   History is limited by: Nothing    HISTORY (Narrative):   Tyrell Balderrama Jr. is a 74 y.o. male with history listed below presenting to the ER for cough, runny nose, fever, chills with shortness of breath over the last 3 days.  Denies sick contacts.  Denies chest pain, dizziness, nausea, vomiting, diarrhea, headache, vision changes, abdominal pain, back pain, dysuria, frequency, and other associated symptoms.      Nursing Notes were all reviewed and agreed with or any disagreements were addressed in the HPI.    Past History     PAST MEDICAL HISTORY:  Past Medical History:   Diagnosis Date    CAD (coronary artery disease)     Diabetes (HCC)     Heart disease     Hypertension        PAST SURGICAL HISTORY:  Past Surgical History:   Procedure Laterality Date    HEMORRHOID SURGERY      [place] UNLISTED PROCEDURE CARDIAC SURGERY         FAMILY HISTORY:  Family History   Problem Relation Age of Onset    Alcohol Abuse Father     Hypertension Paternal Uncle     Heart Disease Paternal Uncle     No Known Problems Brother     No Known Problems Brother     No Known Problems Brother     Cancer Mother         ovarian    Cancer Brother         Prostate       SOCIAL HISTORY:  Social History     Tobacco Use    Smoking status: Never    Smokeless tobacco: Never    Tobacco comments:     Quit smoking: occ   Substance Use Topics    Alcohol use: No    Drug use: No       MEDICATIONS:  No current facility-administered medications on file prior

## 2024-12-24 ENCOUNTER — TELEPHONE (OUTPATIENT)
Age: 74
End: 2024-12-24

## 2025-01-20 ENCOUNTER — TELEPHONE (OUTPATIENT)
Age: 75
End: 2025-01-20

## 2025-01-20 NOTE — TELEPHONE ENCOUNTER
Ilda states that pt discharged on 1-16-25 from  rehab.     Pt will need a HFU on Tue/Thur only due to dialysis.    Please all Ilda to schedule.      Cell given for after 12 noon #[phone]

## 2025-01-20 NOTE — TELEPHONE ENCOUNTER
Pt's wife called in states pt not sleeping well, tried otc melatonin pt still unable to sleep. Pt's wife requesting RX be sent in to pharmacy.     Confirmed [facility] on [address].     Please call to discuss.

## 2025-01-28 ENCOUNTER — OFFICE VISIT (OUTPATIENT)
Age: 75
End: 2025-01-28
Payer: MEDICARE

## 2025-01-28 VITALS
HEART RATE: 71 BPM | WEIGHT: 191 LBS | SYSTOLIC BLOOD PRESSURE: 124 MMHG | OXYGEN SATURATION: 96 % | BODY MASS INDEX: 30.7 KG/M2 | HEIGHT: 66 IN | TEMPERATURE: 98 F | DIASTOLIC BLOOD PRESSURE: 71 MMHG | RESPIRATION RATE: 16 BRPM

## 2025-01-28 DIAGNOSIS — E11.42 DIABETIC POLYNEUROPATHY ASSOCIATED WITH TYPE 2 DIABETES MELLITUS (HCC): ICD-10-CM

## 2025-01-28 DIAGNOSIS — F51.01 PRIMARY INSOMNIA: ICD-10-CM

## 2025-01-28 DIAGNOSIS — E08.10 DIABETIC KETOACIDOSIS WITHOUT COMA ASSOCIATED WITH DIABETES MELLITUS DUE TO UNDERLYING CONDITION (HCC): Primary | ICD-10-CM

## 2025-01-28 DIAGNOSIS — I10 ESSENTIAL HYPERTENSION: ICD-10-CM

## 2025-01-28 PROCEDURE — 99214 OFFICE O/P EST MOD 30 MIN: CPT | Performed by: INTERNAL MEDICINE

## 2025-01-28 RX ORDER — GABAPENTIN 100 MG/1
100 CAPSULE ORAL 3 TIMES DAILY
COMMUNITY
Start: 2025-01-16 | End: 2025-01-28 | Stop reason: SDUPTHER

## 2025-01-28 RX ORDER — TRAZODONE HYDROCHLORIDE 50 MG/1
50 TABLET, FILM COATED ORAL NIGHTLY
Qty: 90 TABLET | Refills: 3 | Status: SHIPPED | OUTPATIENT
Start: 2025-01-28

## 2025-01-28 RX ORDER — METOPROLOL TARTRATE 25 MG/1
25 TABLET, FILM COATED ORAL DAILY
Qty: 90 TABLET | Refills: 2 | Status: SHIPPED | OUTPATIENT
Start: 2025-01-28

## 2025-01-28 RX ORDER — PANTOPRAZOLE SODIUM 40 MG/1
40 TABLET, DELAYED RELEASE ORAL
Qty: 30 TABLET | Refills: 1 | Status: CANCELLED | OUTPATIENT
Start: 2025-01-28

## 2025-01-28 RX ORDER — GABAPENTIN 100 MG/1
100 CAPSULE ORAL 3 TIMES DAILY
Qty: 270 CAPSULE | Refills: 1 | Status: SHIPPED | OUTPATIENT
Start: 2025-01-28 | End: 2025-07-27

## 2025-01-28 RX ORDER — INSULIN GLARGINE 100 [IU]/ML
INJECTION, SOLUTION SUBCUTANEOUS
COMMUNITY
Start: 2025-01-15

## 2025-01-28 RX ORDER — APIXABAN 5 MG/1
5 TABLET, FILM COATED ORAL 2 TIMES DAILY
COMMUNITY
End: 2025-01-28 | Stop reason: SDUPTHER

## 2025-01-28 RX ORDER — TORSEMIDE 100 MG/1
100 TABLET ORAL EVERY MORNING
COMMUNITY
End: 2025-01-28 | Stop reason: SDUPTHER

## 2025-01-28 RX ORDER — SEVELAMER CARBONATE 800 MG/1
1 TABLET, FILM COATED ORAL 3 TIMES DAILY
COMMUNITY
Start: 2025-01-15

## 2025-01-28 RX ORDER — TORSEMIDE 100 MG/1
100 TABLET ORAL EVERY MORNING
Qty: 90 TABLET | Refills: 3 | Status: SHIPPED | OUTPATIENT
Start: 2025-01-28

## 2025-01-28 RX ORDER — APIXABAN 5 MG/1
5 TABLET, FILM COATED ORAL 2 TIMES DAILY
Qty: 180 TABLET | Refills: 1 | Status: SHIPPED | OUTPATIENT
Start: 2025-01-28

## 2025-01-28 RX ORDER — PANTOPRAZOLE SODIUM 40 MG/1
40 TABLET, DELAYED RELEASE ORAL
COMMUNITY

## 2025-01-28 ASSESSMENT — PATIENT HEALTH QUESTIONNAIRE - PHQ9
SUM OF ALL RESPONSES TO PHQ QUESTIONS 1-9: 0
SUM OF ALL RESPONSES TO PHQ QUESTIONS 1-9: 0
1. LITTLE INTEREST OR PLEASURE IN DOING THINGS: NOT AT ALL
SUM OF ALL RESPONSES TO PHQ QUESTIONS 1-9: 0
SUM OF ALL RESPONSES TO PHQ QUESTIONS 1-9: 0
2. FEELING DOWN, DEPRESSED OR HOPELESS: NOT AT ALL
SUM OF ALL RESPONSES TO PHQ9 QUESTIONS 1 & 2: 0

## 2025-01-28 NOTE — PROGRESS NOTES
PROGRESS NOTE  Name: Tyrell Balderrama Jr.   : 1950       ASSESSMENT/ PLAN:     Tyrell was seen today for follow-up from hospital.    Diabetic ketoacidosis without coma associated with diabetes mellitus due to underlying condition (HCC): Clinically improved; gluc running low 100s. Continue current treatment.     Diabetic polyneuropathy associated with type 2 diabetes mellitus (HCC): This is tolerable on a lower dose of the gabapentin.  We will continue this.  -     gabapentin (NEURONTIN) 100 MG capsule; Take 1 capsule by mouth 3 times daily for 180 days. Max Daily Amount: 300 mg    Essential hypertension: Blood pressure is fine today.  Continue current medications.  It appears he may be back on the ARB with hydrochlorothiazide; defer to nephrology whether to continue this or stop it in the setting of ESRD on dialysis.    Primary insomnia: He has tried and failed over-the-counter measures including melatonin and sedating antihistamines.  We will try trazodone.  -     traZODone (DESYREL) 50 MG tablet; Take 1 tablet by mouth nightly    KEVIN/ESRD: He is currently in dialysis 3 days a week in [place].  Continue follow-up with nephrology.      Acute hypoxic respiratory failure: He is clinically improved and is off oxygen.  -     torsemide (DEMADEX) 100 MG tablet; Take 1 tablet by mouth every morning  -     metoprolol tartrate (LOPRESSOR) 25 MG tablet; Take 1 tablet by mouth daily  -     ELIQUIS 5 MG TABS tablet; Take 1 tablet by mouth 2 times daily    Influenza: Symptoms have subsided.    Anemia: He has not had any recent melena.  Blood counts are being watched through his nephrologist/dialysis center.    Atrial flutter with rapid ventricular response: He is on apixaban and should continue this.  Continue cardiology follow-up.    History of CAD s/p PCI w ORLNADO , HLD: [facility]pt [facility] w Dr. Shea, NP Deepti Cao. Had myalgias w statin.  The Plavix was discontinued at [facility], with instructions to

## 2025-01-28 NOTE — PROGRESS NOTES
\"Have you been to the ER, urgent care clinic since your last visit?  Hospitalized since your last visit?\"    [facility] for pneumonia     “Have you seen or consulted any other health care providers outside our system since your last visit?”    NO      “Have you had a colorectal cancer screening such as a colonoscopy/FIT/Cologuard?    NO    No colonoscopy on file  Date of last Cologuard: 11/16/2020  No FIT/FOBT on file   No flexible sigmoidoscopy on file     “Have you had a diabetic eye exam?”    NO     Date of last diabetic eye exam: 11/17/2022

## 2025-01-30 ENCOUNTER — TELEPHONE (OUTPATIENT)
Age: 75
End: 2025-01-30

## 2025-01-30 NOTE — TELEPHONE ENCOUNTER
Pt called in states discussed sleeping medication with pcp at 01/28/25 appt but forgot which medication pcp recommended.    Please call to advise.

## 2025-02-03 ENCOUNTER — TELEPHONE (OUTPATIENT)
Age: 75
End: 2025-02-03

## 2025-02-03 RX ORDER — TRAZODONE HYDROCHLORIDE 50 MG/1
50 TABLET, FILM COATED ORAL NIGHTLY
Qty: 30 TABLET | Refills: 0 | Status: SHIPPED | OUTPATIENT
Start: 2025-02-03

## 2025-02-03 NOTE — TELEPHONE ENCOUNTER
traZODone (DESYREL) 50 MG tablet    [facility] is telling pt that it could be 10 days or longer until they have this medication    They would like sent to local as soon as possible as pt is not sleeping at all.    Please send to [facility] #[phone]

## 2025-02-12 ENCOUNTER — TELEPHONE (OUTPATIENT)
Age: 75
End: 2025-02-12

## 2025-02-12 NOTE — TELEPHONE ENCOUNTER
sevelamer (RENVELA) 800 MG tablet    Refill to [facility] #[phone]      Pt needs as soon as possible.  By the weekend please

## 2025-02-12 NOTE — TELEPHONE ENCOUNTER
Patient notified he will need to call his kidney doctor for this refill request    States understanding

## 2025-02-14 RX ORDER — SEVELAMER CARBONATE 800 MG/1
TABLET, FILM COATED ORAL
Qty: 180 TABLET | Refills: 0 | OUTPATIENT
Start: 2025-02-14

## 2025-02-14 RX ORDER — SEVELAMER CARBONATE 800 MG/1
1 TABLET, FILM COATED ORAL 3 TIMES DAILY
Qty: 90 TABLET | Refills: 1 | Status: SHIPPED | OUTPATIENT
Start: 2025-02-14

## 2025-02-14 NOTE — TELEPHONE ENCOUNTER
PCP: Jluis Levi MD    Last appt:   1/28/2025    Future Appointments   Date Time Provider Department Center   7/28/2025  9:00 AM Jluis Levi MD [facility] DEP       Requested Prescriptions     Pending Prescriptions Disp Refills    sevelamer (RENVELA) 800 MG tablet 90 tablet 1     Sig: Take 1 tablet by mouth 3 times daily

## 2025-02-14 NOTE — TELEPHONE ENCOUNTER
sevelamer (RENVELA) 800 MG tablet    Refill to [facility] #[phone]      Pt will need a quickly as possible.

## 2025-02-24 ENCOUNTER — TELEPHONE (OUTPATIENT)
Age: 75
End: 2025-02-24

## 2025-02-24 NOTE — TELEPHONE ENCOUNTER
Caller requests Refill of:  Furosemide    Pt states was prescribed this medication at [facility] by Dr. Ohara but unsure of dosage and would like to know if pcp will take over prescription.    Please send to:    [id number] - [place], [place] - [address] - P [phone] - F [phone]  [address] [address]  Phone: [phone] Fax: [phone]    Visit / Appointment History:  Future Appointment at [facility]:  7/28/2025   Last Appointment With PCP:  1/28/2025       Caller confirmed instructions and dosages as correct.    Caller was advised that Meds will be refilled as soon as possible, however there can be a 48-72 business hour turn around on refill requests.

## 2025-02-25 RX ORDER — TORSEMIDE 100 MG/1
100 TABLET ORAL EVERY MORNING
Qty: 90 TABLET | Refills: 3 | Status: SHIPPED
Start: 2025-02-25 | End: 2025-02-26 | Stop reason: SDUPTHER

## 2025-02-25 NOTE — TELEPHONE ENCOUNTER
I spoke to Elinor, Pharmacist, she reported the patient's daughter called yesterday requesting lasix; however, they do not have an active Rx on file.   Dr. Ohara sent in torsemide (DEMADEX) 100 MG tablet On Chart Dose: 100 mg.    I told Elinor I would ask  about lasix refill.

## 2025-02-26 RX ORDER — SEVELAMER CARBONATE 800 MG/1
1 TABLET, FILM COATED ORAL 3 TIMES DAILY
Qty: 90 TABLET | Refills: 1 | Status: SHIPPED | OUTPATIENT
Start: 2025-02-26 | End: 2025-02-26 | Stop reason: SDUPTHER

## 2025-02-26 RX ORDER — SEVELAMER CARBONATE 800 MG/1
1 TABLET, FILM COATED ORAL 3 TIMES DAILY
Qty: 90 TABLET | Refills: 1 | Status: SHIPPED | OUTPATIENT
Start: 2025-02-26

## 2025-02-26 RX ORDER — TORSEMIDE 100 MG/1
100 TABLET ORAL EVERY MORNING
Qty: 90 TABLET | Refills: 3 | Status: SHIPPED | OUTPATIENT
Start: 2025-02-26

## 2025-03-13 ENCOUNTER — TELEPHONE (OUTPATIENT)
Age: 75
End: 2025-03-13

## 2025-03-13 RX ORDER — TRAZODONE HYDROCHLORIDE 50 MG/1
50 TABLET ORAL NIGHTLY
Qty: 30 TABLET | Refills: 0 | Status: SHIPPED | OUTPATIENT
Start: 2025-03-13

## 2025-03-19 NOTE — TELEPHONE ENCOUNTER
torsemide (DEMADEX) 100 MG tablet    Pt states that he takes 3 tablets/pills per day.  He did not get enough.  Why he is asking?

## 2025-03-20 ENCOUNTER — TELEPHONE (OUTPATIENT)
Age: 75
End: 2025-03-20

## 2025-03-20 RX ORDER — SEVELAMER CARBONATE 800 MG/1
3 TABLET, FILM COATED ORAL 3 TIMES DAILY
Qty: 810 TABLET | Refills: 3 | Status: SHIPPED | OUTPATIENT
Start: 2025-03-20

## 2025-03-20 RX ORDER — TORSEMIDE 100 MG/1
300 TABLET ORAL EVERY MORNING
Qty: 90 TABLET | Refills: 3 | Status: CANCELLED | OUTPATIENT
Start: 2025-03-20

## 2025-03-20 NOTE — TELEPHONE ENCOUNTER
CALLER:  Pt wife      MEDICATION:  sevelamer (RENVELA) 800 MG tablet     PROBLEM:  Current instructions;  Take 1 tablet by mouth 3 times daily  Specialist had recommended it changed, needs script updated  Take 800 mg tablet, 3 tablets 3 times daily (9 tabs daily)  Pt running out as has been taking as specialist recommended.    ADJUSTMENT REQUEST:  Change to:  Take 800 mg tablet, 3 tablets 3 times daily (total of 9 tabs daily)      PHARMACY:  [id number] [address] [phone] - F [phone]                Caller confirms readback of medication name, dosage, and instructions as correctly documented.

## 2025-03-20 NOTE — TELEPHONE ENCOUNTER
Wife called    Regarding: torsemide (DEMADEX) 100 MG tablet  Disregard previous request from pt  Wife state pt mixed up medications.  Torsemide should be:  100 mg tablet, t tablet, 1 time a day.  Pt does NOT need torsemide.      FOLLOW UP:  Please change chart back to 100 mg, 1 tablet, 1 time a day.   Do not send in med at this time.

## 2025-03-20 NOTE — TELEPHONE ENCOUNTER
Please advise . Pt states that he takes 3 tablets/pills per day.  He did not get enough.  Why he is asking?

## 2025-04-03 ENCOUNTER — TELEPHONE (OUTPATIENT)
Age: 75
End: 2025-04-03

## 2025-04-03 DIAGNOSIS — R06.00 DYSPNEA, UNSPECIFIED TYPE: Primary | ICD-10-CM

## 2025-04-03 NOTE — TELEPHONE ENCOUNTER
Ilda states that pt needs a referral for pulmonology.    Fax [phone]  Attn: Dr. Belle    First f/u since getting out of hospital.  This is to set dialysis up at home.     Please call Ilda with any questions /problems.

## 2025-04-08 NOTE — TELEPHONE ENCOUNTER
First I've heard of this.     Are we sure this is pulmonology? Pulmonary doesn't do dialysis. Double check what is needed.     BJF

## 2025-04-08 NOTE — TELEPHONE ENCOUNTER
Dr. Belle's office is waiting on referral and have been.      When can we get this to them so pt can be seen?     Fax number below.    [phone]

## 2025-04-10 NOTE — TELEPHONE ENCOUNTER
I spoke to the patient's daughter, the reason her dad needs a referral for pulmonology, is because prior to getting dialysis at home, he needs to see a pulmonologist.     Fax [phone]  Attn: Dr. Belle

## 2025-04-28 RX ORDER — INSULIN GLARGINE 100 [IU]/ML
INJECTION, SOLUTION SUBCUTANEOUS
Qty: 5 ADJUSTABLE DOSE PRE-FILLED PEN SYRINGE | Refills: 2 | Status: SHIPPED | OUTPATIENT
Start: 2025-04-28

## 2025-04-28 NOTE — TELEPHONE ENCOUNTER
Caller requests Refill of:    LANTUS SOLOSTAR 100 UNIT/ML injection pen     Please send to:  [facility] - [place], [address] - P [phone] - F [phone]          Visit / Appointment History:  Future Appointment at [facility]:  7/28/2025   Last Appointment With PCP:  1/28/2025       Caller confirmed instructions and dosages as correct.    Caller was advised that Meds will be refilled as soon as possible, however there can be a 48-72 business hour turn around on refill requests.  
[FreeTextEntry1] : PLAN:\par \par - Paxlovid 5 day pack to be started if any worsening over the next 24-48 hours\par -  Monitor pulse ox and symptoms; if pulse ox <94% or worsening SOB, go to ED for evaluation\par -  OTC meds including ibuprofen and APAP for symptoms\par -  OTC cough suppressants as needed\par -  Maintain hydration\par -  Rest and quarantine\par \par

## 2025-05-13 ENCOUNTER — HOSPITAL ENCOUNTER (OUTPATIENT)
Facility: HOSPITAL | Age: 75
Discharge: HOME | End: 2025-05-16
Payer: MEDICARE

## 2025-05-13 ENCOUNTER — TRANSCRIBE ORDERS (OUTPATIENT)
Facility: HOSPITAL | Age: 75
End: 2025-05-13

## 2025-05-13 DIAGNOSIS — Z01.811 PRE-OPERATIVE RESPIRATORY EXAMINATION: Primary | ICD-10-CM

## 2025-05-13 DIAGNOSIS — Z01.811 PRE-OPERATIVE RESPIRATORY EXAMINATION: ICD-10-CM

## 2025-05-13 PROCEDURE — 71046 X-RAY EXAM CHEST 2 VIEWS: CPT

## 2025-07-09 RX ORDER — PEN NEEDLE, DIABETIC 32GX 5/32"
1 NEEDLE, DISPOSABLE MISCELLANEOUS DAILY
Qty: 100 EACH | Refills: 3 | Status: SHIPPED | OUTPATIENT
Start: 2025-07-09

## 2025-07-09 NOTE — TELEPHONE ENCOUNTER
PCP: Jluis Levi MD    Last appt:   2025    Future Appointments   Date Time Provider Department Center   2025  9:00 AM Jluis Levi MD [facility] DEP       Requested Prescriptions     Pending Prescriptions Disp Refills    Insulin Pen Needle (BD PEN NEEDLE PRANAV 2ND GEN) 32G X 4 MM MISC 100 each 3     Si each by Does not apply route daily

## 2025-07-09 NOTE — TELEPHONE ENCOUNTER
Wife states that pt needs refill on pen needles.  I could not find in chart and she states it has been along time.      BD latoya   [id number]  needle   (100 qty /box)     90 day refill to [facility] mail order.

## 2025-07-21 ENCOUNTER — TELEPHONE (OUTPATIENT)
Age: 75
End: 2025-07-21

## 2025-07-21 NOTE — TELEPHONE ENCOUNTER
Regarding Blood Sugar Management    States blood sugar readings have been consistently elevated.  Indicates the peritoneal dialysis procedure is contributing additional glucose.  Currently taking 16 mg of Lantus daily.  Reports blood sugar has averaged 233 over the past 2 days.  Asks what steps should be taken to address elevated readings.  Follow-Up Request:  Request to contact patient’s wife to advise on next steps.  [phone]  (wife work phone)

## 2025-07-23 NOTE — TELEPHONE ENCOUNTER
I let the patient's wife know, per , \"increase lantus by 5 units, to 23.   Then every 3 days increase by another 2 units until the fasting levels are mostly below 150 (and above 90). \"Patient's wife verbalized understanding of information discussed w/ no further questions at this time.

## 2025-07-23 NOTE — TELEPHONE ENCOUNTER
Increase lantus by 5 units, to 23.   Then every 3 days increase by another 2 units until the fasting levels are mostly below 150 (and above 90).     BJF

## 2025-07-30 DIAGNOSIS — E11.42 DIABETIC POLYNEUROPATHY ASSOCIATED WITH TYPE 2 DIABETES MELLITUS (HCC): ICD-10-CM

## 2025-07-30 RX ORDER — GABAPENTIN 100 MG/1
CAPSULE ORAL
Qty: 270 CAPSULE | Refills: 1 | Status: SHIPPED | OUTPATIENT
Start: 2025-07-30 | End: 2026-01-26

## 2025-08-01 RX ORDER — APIXABAN 5 MG/1
5 TABLET, FILM COATED ORAL 2 TIMES DAILY
Qty: 180 TABLET | Refills: 3 | Status: SHIPPED | OUTPATIENT
Start: 2025-08-01